# Patient Record
Sex: FEMALE | Race: BLACK OR AFRICAN AMERICAN | NOT HISPANIC OR LATINO | URBAN - METROPOLITAN AREA
[De-identification: names, ages, dates, MRNs, and addresses within clinical notes are randomized per-mention and may not be internally consistent; named-entity substitution may affect disease eponyms.]

---

## 2017-01-06 ENCOUNTER — EMERGENCY (EMERGENCY)
Facility: HOSPITAL | Age: 37
LOS: 1 days | End: 2017-01-06
Payer: OTHER MISCELLANEOUS

## 2017-01-06 PROCEDURE — 99283 EMERGENCY DEPT VISIT LOW MDM: CPT

## 2017-02-16 ENCOUNTER — OUTPATIENT (OUTPATIENT)
Dept: OUTPATIENT SERVICES | Facility: HOSPITAL | Age: 37
LOS: 1 days | End: 2017-02-16

## 2017-05-21 ENCOUNTER — EMERGENCY (EMERGENCY)
Facility: HOSPITAL | Age: 37
LOS: 1 days | End: 2017-05-21
Payer: COMMERCIAL

## 2017-05-21 PROCEDURE — 99283 EMERGENCY DEPT VISIT LOW MDM: CPT | Mod: 25

## 2017-05-21 PROCEDURE — 73610 X-RAY EXAM OF ANKLE: CPT | Mod: 26,RT

## 2017-05-21 PROCEDURE — 99053 MED SERV 10PM-8AM 24 HR FAC: CPT

## 2017-09-28 ENCOUNTER — EMERGENCY (EMERGENCY)
Facility: HOSPITAL | Age: 37
LOS: 1 days | End: 2017-09-28
Payer: COMMERCIAL

## 2017-09-28 PROCEDURE — 99284 EMERGENCY DEPT VISIT MOD MDM: CPT

## 2017-09-28 PROCEDURE — 70450 CT HEAD/BRAIN W/O DYE: CPT | Mod: 26

## 2017-09-28 PROCEDURE — 72125 CT NECK SPINE W/O DYE: CPT | Mod: 26

## 2018-12-20 ENCOUNTER — APPOINTMENT (OUTPATIENT)
Dept: OBGYN | Facility: CLINIC | Age: 38
End: 2018-12-20
Payer: COMMERCIAL

## 2018-12-20 VITALS
BODY MASS INDEX: 45.07 KG/M2 | HEIGHT: 64 IN | DIASTOLIC BLOOD PRESSURE: 74 MMHG | WEIGHT: 264 LBS | SYSTOLIC BLOOD PRESSURE: 118 MMHG

## 2018-12-20 DIAGNOSIS — N76.0 ACUTE VAGINITIS: ICD-10-CM

## 2018-12-20 PROCEDURE — 99213 OFFICE O/P EST LOW 20 MIN: CPT

## 2018-12-23 LAB
CANDIDA VAG CYTO: NOT DETECTED
G VAGINALIS+PREV SP MTYP VAG QL MICRO: DETECTED
T VAGINALIS VAG QL WET PREP: NOT DETECTED

## 2019-03-11 ENCOUNTER — OTHER (OUTPATIENT)
Age: 39
End: 2019-03-11

## 2019-03-11 ENCOUNTER — OUTPATIENT (OUTPATIENT)
Dept: OUTPATIENT SERVICES | Facility: HOSPITAL | Age: 39
LOS: 1 days | End: 2019-03-11
Payer: COMMERCIAL

## 2019-03-11 ENCOUNTER — APPOINTMENT (OUTPATIENT)
Dept: OBGYN | Facility: CLINIC | Age: 39
End: 2019-03-11
Payer: COMMERCIAL

## 2019-03-11 VITALS
HEIGHT: 64 IN | WEIGHT: 254 LBS | DIASTOLIC BLOOD PRESSURE: 80 MMHG | BODY MASS INDEX: 43.36 KG/M2 | SYSTOLIC BLOOD PRESSURE: 120 MMHG

## 2019-03-11 PROCEDURE — 73560 X-RAY EXAM OF KNEE 1 OR 2: CPT | Mod: 26,LT

## 2019-03-11 PROCEDURE — 99395 PREV VISIT EST AGE 18-39: CPT

## 2019-03-11 PROCEDURE — 71046 X-RAY EXAM CHEST 2 VIEWS: CPT | Mod: 26

## 2019-03-11 PROCEDURE — 73600 X-RAY EXAM OF ANKLE: CPT | Mod: 26,RT

## 2019-03-11 NOTE — DISCUSSION/SUMMARY
[FreeTextEntry1] : a39 years old black female came to the office for annual exam physical exam within normal limits pelvic exam within normal limits Pap smear done and the patient for mammogram as 25 minutes of

## 2019-03-11 NOTE — DISCUSSION/SUMMARY
[FreeTextEntry1] : a39 years old black female came to the office for annual exam patient denies having any problems physical on pelvic exam within normal limits within the patient for mammogram as a tightness of the

## 2019-03-13 LAB — HPV HIGH+LOW RISK DNA PNL CVX: NOT DETECTED

## 2019-03-15 LAB — CYTOLOGY CVX/VAG DOC THIN PREP: NORMAL

## 2019-03-16 ENCOUNTER — OUTPATIENT (OUTPATIENT)
Dept: OUTPATIENT SERVICES | Facility: HOSPITAL | Age: 39
LOS: 1 days | End: 2019-03-16
Payer: COMMERCIAL

## 2019-03-16 PROCEDURE — 76856 US EXAM PELVIC COMPLETE: CPT | Mod: 26

## 2019-03-16 PROCEDURE — 76700 US EXAM ABDOM COMPLETE: CPT | Mod: 26

## 2019-08-06 ENCOUNTER — EMERGENCY (EMERGENCY)
Facility: HOSPITAL | Age: 39
LOS: 1 days | End: 2019-08-06
Admitting: EMERGENCY MEDICINE
Payer: COMMERCIAL

## 2019-08-06 PROCEDURE — 99283 EMERGENCY DEPT VISIT LOW MDM: CPT

## 2020-03-12 ENCOUNTER — APPOINTMENT (OUTPATIENT)
Dept: CT IMAGING | Facility: CLINIC | Age: 40
End: 2020-03-12
Payer: COMMERCIAL

## 2020-03-12 PROCEDURE — 74176 CT ABD & PELVIS W/O CONTRAST: CPT

## 2020-04-26 ENCOUNTER — MESSAGE (OUTPATIENT)
Age: 40
End: 2020-04-26

## 2020-05-06 LAB
SARS-COV-2 IGG SERPL IA-ACNC: 0.2 RATIO
SARS-COV-2 IGG SERPL QL IA: NEGATIVE

## 2020-08-04 ENCOUNTER — OUTPATIENT (OUTPATIENT)
Dept: OUTPATIENT SERVICES | Facility: HOSPITAL | Age: 40
LOS: 1 days | End: 2020-08-04
Payer: COMMERCIAL

## 2020-08-04 PROCEDURE — 71046 X-RAY EXAM CHEST 2 VIEWS: CPT | Mod: 26

## 2020-11-25 ENCOUNTER — OUTPATIENT (OUTPATIENT)
Dept: OUTPATIENT SERVICES | Facility: HOSPITAL | Age: 40
LOS: 1 days | End: 2020-11-25

## 2020-12-16 PROBLEM — N76.0 ACUTE VAGINITIS: Status: RESOLVED | Noted: 2018-12-20 | Resolved: 2020-12-16

## 2021-08-29 ENCOUNTER — EMERGENCY (EMERGENCY)
Facility: HOSPITAL | Age: 41
LOS: 1 days | End: 2021-08-29
Admitting: EMERGENCY MEDICINE

## 2021-09-13 ENCOUNTER — APPOINTMENT (OUTPATIENT)
Dept: CT IMAGING | Facility: CLINIC | Age: 41
End: 2021-09-13
Payer: COMMERCIAL

## 2021-09-13 ENCOUNTER — APPOINTMENT (OUTPATIENT)
Dept: OBGYN | Facility: CLINIC | Age: 41
End: 2021-09-13

## 2021-09-13 PROCEDURE — 74176 CT ABD & PELVIS W/O CONTRAST: CPT

## 2021-10-07 ENCOUNTER — APPOINTMENT (OUTPATIENT)
Dept: OBGYN | Facility: CLINIC | Age: 41
End: 2021-10-07
Payer: COMMERCIAL

## 2021-10-07 VITALS
WEIGHT: 254 LBS | DIASTOLIC BLOOD PRESSURE: 80 MMHG | BODY MASS INDEX: 43.36 KG/M2 | HEIGHT: 64 IN | SYSTOLIC BLOOD PRESSURE: 114 MMHG

## 2021-10-07 DIAGNOSIS — Z72.89 OTHER PROBLEMS RELATED TO LIFESTYLE: ICD-10-CM

## 2021-10-07 DIAGNOSIS — Z13.71 ENCOUNTER FOR NONPROCREATIVE SCREENING FOR GENETIC DISEASE CARRIER STATUS: ICD-10-CM

## 2021-10-07 DIAGNOSIS — Z78.9 OTHER SPECIFIED HEALTH STATUS: ICD-10-CM

## 2021-10-07 DIAGNOSIS — Z00.00 ENCOUNTER FOR GENERAL ADULT MEDICAL EXAMINATION W/OUT ABNORMAL FINDINGS: ICD-10-CM

## 2021-10-07 DIAGNOSIS — Z83.49 FAMILY HISTORY OF OTHER ENDOCRINE, NUTRITIONAL AND METABOLIC DISEASES: ICD-10-CM

## 2021-10-07 LAB
DATE COLLECTED: NORMAL
DATE COLLECTED: NORMAL
HEMOCCULT 2: NEGATIVE
HEMOCCULT SP1 STL QL: NEGATIVE
QUALITY CONTROL: YES
QUALITY CONTROL: YES

## 2021-10-07 PROCEDURE — 82270 OCCULT BLOOD FECES: CPT

## 2021-10-07 PROCEDURE — 99396 PREV VISIT EST AGE 40-64: CPT

## 2021-10-07 RX ORDER — NYSTATIN 100000 [USP'U]/G
100000 CREAM TOPICAL TWICE DAILY
Qty: 1 | Refills: 3 | Status: DISCONTINUED | COMMUNITY
Start: 2018-12-20 | End: 2021-10-07

## 2021-10-07 RX ORDER — FLUCONAZOLE 150 MG/1
150 TABLET ORAL
Refills: 0 | Status: DISCONTINUED | COMMUNITY
End: 2021-10-07

## 2021-10-07 RX ORDER — FLUCONAZOLE 150 MG/1
150 TABLET ORAL
Qty: 3 | Refills: 3 | Status: DISCONTINUED | COMMUNITY
Start: 2018-12-20 | End: 2021-10-07

## 2021-10-07 RX ORDER — METRONIDAZOLE 500 MG/1
500 TABLET ORAL
Qty: 15 | Refills: 7 | Status: DISCONTINUED | COMMUNITY
Start: 2018-12-23 | End: 2021-10-07

## 2021-10-22 ENCOUNTER — OUTPATIENT (OUTPATIENT)
Dept: OUTPATIENT SERVICES | Facility: HOSPITAL | Age: 41
LOS: 1 days | End: 2021-10-22

## 2021-10-27 LAB
C TRACH RRNA SPEC QL NAA+PROBE: NOT DETECTED
CANDIDA VAG CYTO: NOT DETECTED
CYTOLOGY CVX/VAG DOC THIN PREP: NORMAL
G VAGINALIS+PREV SP MTYP VAG QL MICRO: NOT DETECTED
HBV SURFACE AB SER QL: REACTIVE
HBV SURFACE AG SER QL: NONREACTIVE
HCV AB SER QL: NONREACTIVE
HCV S/CO RATIO: 0.08 S/CO
HIV1+2 AB SPEC QL IA.RAPID: NONREACTIVE
HPV HIGH+LOW RISK DNA PNL CVX: NOT DETECTED
HSV1 IGM SER QL: NEGATIVE
HSV2 AB FLD-ACNC: NEGATIVE
N GONORRHOEA RRNA SPEC QL NAA+PROBE: NOT DETECTED
SOURCE TP AMPLIFICATION: NORMAL
T PALLIDUM AB SER QL IA: NEGATIVE
T VAGINALIS VAG QL WET PREP: NOT DETECTED

## 2021-11-23 ENCOUNTER — NON-APPOINTMENT (OUTPATIENT)
Age: 41
End: 2021-11-23

## 2021-11-23 LAB
HSV 1+2 IGG SER IA-IMP: NEGATIVE
HSV 1+2 IGG SER IA-IMP: POSITIVE
HSV1 IGG SER QL: 45 INDEX
HSV2 IGG SER QL: 0.55 INDEX

## 2021-11-23 NOTE — PHYSICAL EXAM
[Appropriately responsive] : appropriately responsive [Alert] : alert [No Acute Distress] : no acute distress [No Lymphadenopathy] : no lymphadenopathy [Regular Rate Rhythm] : regular rate rhythm [No Murmurs] : no murmurs [Clear to Auscultation B/L] : clear to auscultation bilaterally [Soft] : soft [Non-tender] : non-tender [Non-distended] : non-distended [No HSM] : No HSM [No Lesions] : no lesions [No Mass] : no mass [Oriented x3] : oriented x3 [Examination Of The Breasts] : a normal appearance [No Masses] : no breast masses were palpable [Labia Majora] : normal [Labia Minora] : normal [Normal] : normal [Uterine Adnexae] : normal [FreeTextEntry9] : Neg

## 2021-11-23 NOTE — HISTORY OF PRESENT ILLNESS
[Patient reported PAP Smear was normal] : Patient reported PAP Smear was normal [FreeTextEntry1] : 42yo  LMP  not on bcm here for annual. She saw her previous GYN MD and was told she had fibroids. s/p COVID vaccine (Pz).  [Mammogramdate] : Never  [BreastSonogramDate] : Never  [PapSmeardate] : 2020  [BoneDensityDate] : N/A [ColonoscopyDate] : N/A

## 2021-11-23 NOTE — DISCUSSION/SUMMARY
[FreeTextEntry1] : 40yo  LMP  doing well. \par \par RHM: \par - DVSneg x 3\par - Dentist, PCP, Derm as discussed \par - Rx given for mammo/sono\par - Offered STI screen today. Pt accepts \par - Encouraged healthy diet, exercise, weight loss\par \par Karla Auguste MD \par Obstetrician/Gynecologist\par \par

## 2022-01-25 ENCOUNTER — APPOINTMENT (OUTPATIENT)
Dept: MAMMOGRAPHY | Facility: CLINIC | Age: 42
End: 2022-01-25
Payer: COMMERCIAL

## 2022-01-25 ENCOUNTER — RESULT REVIEW (OUTPATIENT)
Age: 42
End: 2022-01-25

## 2022-01-25 PROCEDURE — 77067 SCR MAMMO BI INCL CAD: CPT

## 2022-01-25 PROCEDURE — 77063 BREAST TOMOSYNTHESIS BI: CPT

## 2022-01-27 ENCOUNTER — OUTPATIENT (OUTPATIENT)
Dept: OUTPATIENT SERVICES | Facility: HOSPITAL | Age: 42
LOS: 1 days | End: 2022-01-27

## 2022-01-27 ENCOUNTER — EMERGENCY (EMERGENCY)
Facility: HOSPITAL | Age: 42
LOS: 1 days | Discharge: HOME | End: 2022-01-27
Payer: COMMERCIAL

## 2022-01-27 DIAGNOSIS — H53.8 OTHER VISUAL DISTURBANCES: ICD-10-CM

## 2022-01-27 DIAGNOSIS — E66.01 MORBID (SEVERE) OBESITY DUE TO EXCESS CALORIES: ICD-10-CM

## 2022-01-27 DIAGNOSIS — R07.9 CHEST PAIN, UNSPECIFIED: ICD-10-CM

## 2022-01-27 PROCEDURE — 70498 CT ANGIOGRAPHY NECK: CPT | Mod: 26

## 2022-01-27 PROCEDURE — 93010 ELECTROCARDIOGRAM REPORT: CPT

## 2022-01-27 PROCEDURE — 70496 CT ANGIOGRAPHY HEAD: CPT | Mod: 26

## 2022-01-27 PROCEDURE — 0042T: CPT

## 2022-01-27 PROCEDURE — 70551 MRI BRAIN STEM W/O DYE: CPT | Mod: 26

## 2022-01-27 PROCEDURE — 99285 EMERGENCY DEPT VISIT HI MDM: CPT

## 2022-01-27 PROCEDURE — 71045 X-RAY EXAM CHEST 1 VIEW: CPT | Mod: 26

## 2022-01-28 ENCOUNTER — OUTPATIENT (OUTPATIENT)
Dept: OUTPATIENT SERVICES | Facility: HOSPITAL | Age: 42
LOS: 1 days | End: 2022-01-28

## 2022-02-08 ENCOUNTER — APPOINTMENT (OUTPATIENT)
Dept: ULTRASOUND IMAGING | Facility: CLINIC | Age: 42
End: 2022-02-08
Payer: COMMERCIAL

## 2022-02-08 ENCOUNTER — RESULT REVIEW (OUTPATIENT)
Age: 42
End: 2022-02-08

## 2022-02-08 PROCEDURE — 76641 ULTRASOUND BREAST COMPLETE: CPT | Mod: 50

## 2022-02-22 DIAGNOSIS — I67.848 OTHER CEREBROVASCULAR VASOSPASM AND VASOCONSTRICTION: ICD-10-CM

## 2022-03-02 DIAGNOSIS — I67.848 OTHER CEREBROVASCULAR VASOSPASM AND VASOCONSTRICTION: ICD-10-CM

## 2022-09-01 ENCOUNTER — EMERGENCY (EMERGENCY)
Facility: HOSPITAL | Age: 42
LOS: 1 days | Discharge: HOME | End: 2022-09-01
Admitting: EMERGENCY MEDICINE

## 2022-09-01 DIAGNOSIS — S00.03XA CONTUSION OF SCALP, INITIAL ENCOUNTER: ICD-10-CM

## 2022-09-01 DIAGNOSIS — Z04.2 ENCOUNTER FOR EXAMINATION AND OBSERVATION FOLLOWING WORK ACCIDENT: ICD-10-CM
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
## 2022-09-01 DIAGNOSIS — S09.8XXA OTHER SPECIFIED INJURIES OF HEAD, INITIAL ENCOUNTER: ICD-10-CM
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
## 2022-09-01 PROCEDURE — 99285 EMERGENCY DEPT VISIT HI MDM: CPT

## 2022-09-01 PROCEDURE — 70450 CT HEAD/BRAIN W/O DYE: CPT | Mod: 26

## 2022-09-01 PROCEDURE — 72125 CT NECK SPINE W/O DYE: CPT | Mod: 26

## 2022-11-21 ENCOUNTER — APPOINTMENT (OUTPATIENT)
Dept: OBGYN | Facility: CLINIC | Age: 42
End: 2022-11-21

## 2022-11-21 VITALS
DIASTOLIC BLOOD PRESSURE: 78 MMHG | WEIGHT: 254 LBS | HEIGHT: 69 IN | SYSTOLIC BLOOD PRESSURE: 135 MMHG | BODY MASS INDEX: 37.62 KG/M2

## 2022-11-21 DIAGNOSIS — Z82.49 FAMILY HISTORY OF ISCHEMIC HEART DISEASE AND OTHER DISEASES OF THE CIRCULATORY SYSTEM: ICD-10-CM

## 2022-11-21 DIAGNOSIS — Z80.3 FAMILY HISTORY OF MALIGNANT NEOPLASM OF BREAST: ICD-10-CM

## 2022-11-21 DIAGNOSIS — D21.9 BENIGN NEOPLASM OF CONNECTIVE AND OTHER SOFT TISSUE, UNSPECIFIED: ICD-10-CM

## 2022-11-21 DIAGNOSIS — Z87.09 PERSONAL HISTORY OF OTHER DISEASES OF THE RESPIRATORY SYSTEM: ICD-10-CM

## 2022-11-21 PROCEDURE — 99396 PREV VISIT EST AGE 40-64: CPT

## 2022-11-21 PROCEDURE — 82274 ASSAY TEST FOR BLOOD FECAL: CPT | Mod: QW

## 2022-11-21 NOTE — DISCUSSION/SUMMARY
[FreeTextEntry1] : 41yo  LMP  not on BCM.  \par \par Elevated TC score & FHx of breast ca: Pt to referred to Dr Aviles but has not gone yet \par - Explained importance of going and pt plans to go \par \par Fibroids: \par - TVUS and MD visit \par \par RHM: \par - DVS neg x 3\par - Dentist, PCP, Derm as discussed \par - Rx given for mammo/sono\par - Colonoscopy as discussed \par - Offered STI screen today. Pt declined\par - Encouraged healthy diet, exercise, weight loss\par \par Karla Auguste MD \par Obstetrician/Gynecologist\par

## 2022-11-21 NOTE — PHYSICAL EXAM
[Appropriately responsive] : appropriately responsive [Alert] : alert [No Acute Distress] : no acute distress [No Lymphadenopathy] : no lymphadenopathy [Soft] : soft [Non-tender] : non-tender [Non-distended] : non-distended [No HSM] : No HSM [No Lesions] : no lesions [No Mass] : no mass [Oriented x3] : oriented x3 [Examination Of The Breasts] : a normal appearance [No Masses] : no breast masses were palpable [Labia Majora] : normal [Labia Minora] : normal [Normal] : normal [FreeTextEntry6] : difficult to palpate 2/2 body habitus [FreeTextEntry9] : Neg

## 2022-11-21 NOTE — HISTORY OF PRESENT ILLNESS
[Patient reported PAP Smear was normal] : Patient reported PAP Smear was normal [FreeTextEntry1] : 41yo  LMP  not on BCM is here for annual. Pt is having back pain related to a fall at work.  Pt has clots with her period but otherwise no heavy bleeding.  [Mammogramdate] : 2022 [TextBox_19] : TC 37%  [BreastSonogramDate] : 2022 [PapSmeardate] : 10/2021

## 2022-11-23 LAB — HPV HIGH+LOW RISK DNA PNL CVX: NOT DETECTED

## 2022-12-02 LAB — CYTOLOGY CVX/VAG DOC THIN PREP: NORMAL

## 2023-01-03 ENCOUNTER — ASOB RESULT (OUTPATIENT)
Age: 43
End: 2023-01-03

## 2023-01-03 ENCOUNTER — APPOINTMENT (OUTPATIENT)
Dept: OBGYN | Facility: CLINIC | Age: 43
End: 2023-01-03
Payer: COMMERCIAL

## 2023-01-03 ENCOUNTER — APPOINTMENT (OUTPATIENT)
Dept: ANTEPARTUM | Facility: CLINIC | Age: 43
End: 2023-01-03
Payer: COMMERCIAL

## 2023-01-03 VITALS
SYSTOLIC BLOOD PRESSURE: 130 MMHG | WEIGHT: 271 LBS | HEIGHT: 69 IN | BODY MASS INDEX: 40.14 KG/M2 | DIASTOLIC BLOOD PRESSURE: 70 MMHG

## 2023-01-03 DIAGNOSIS — N91.1 SECONDARY AMENORRHEA: ICD-10-CM

## 2023-01-03 PROCEDURE — 76856 US EXAM PELVIC COMPLETE: CPT | Mod: 59

## 2023-01-03 PROCEDURE — 99212 OFFICE O/P EST SF 10 MIN: CPT

## 2023-01-03 PROCEDURE — 76830 TRANSVAGINAL US NON-OB: CPT

## 2023-01-07 LAB
HCG UR QL: NEGATIVE
QUALITY CONTROL: YES

## 2023-02-03 ENCOUNTER — APPOINTMENT (OUTPATIENT)
Dept: ULTRASOUND IMAGING | Facility: CLINIC | Age: 43
End: 2023-02-03
Payer: COMMERCIAL

## 2023-02-03 PROCEDURE — 93975 VASCULAR STUDY: CPT

## 2023-02-07 ENCOUNTER — NON-APPOINTMENT (OUTPATIENT)
Age: 43
End: 2023-02-07

## 2023-02-07 ENCOUNTER — APPOINTMENT (OUTPATIENT)
Dept: OBGYN | Facility: CLINIC | Age: 43
End: 2023-02-07
Payer: COMMERCIAL

## 2023-02-07 VITALS
BODY MASS INDEX: 38.95 KG/M2 | HEIGHT: 69 IN | WEIGHT: 263 LBS | DIASTOLIC BLOOD PRESSURE: 110 MMHG | SYSTOLIC BLOOD PRESSURE: 166 MMHG

## 2023-02-07 VITALS — SYSTOLIC BLOOD PRESSURE: 153 MMHG | DIASTOLIC BLOOD PRESSURE: 83 MMHG

## 2023-02-07 LAB
DATE COLLECTED: NORMAL
HEMOCCULT SP1 STL QL: NEGATIVE
QUALITY CONTROL: YES

## 2023-02-07 PROCEDURE — 99212 OFFICE O/P EST SF 10 MIN: CPT

## 2023-02-07 NOTE — HISTORY OF PRESENT ILLNESS
[FreeTextEntry1] : 44yo  LMP   is here to follow up oligomehorrhea. She had a TVUS on 1/3 which demonstrated a 6mm EMS, a 1cm simple R ov cyst; her L ov is wnl.  She has 4 fibroids ranging from 1-7cm in size. \par \par Pt's period came after she had a sono. She has not had to use the provera tabs. Her Feb period came naturally as well. \par \par She reports that her BP is elevated now as a result of meds (steriods etc.). She needed to go to the ED as her BP was  200s/100s.

## 2023-02-07 NOTE — DISCUSSION/SUMMARY
[FreeTextEntry1] : 44yo  LMP 2/  is here to follow up oligomehorrhea -- which has resolved. Now with simple R ov cyst on sono and HTN (which preeceded today's visit)\par \par HTN: BP back to reasonable level (pt says her BP was at this level in Dr Morrison's office) and she just took her meds on her way here to her visit. \par - Pt seeing Dr Morrison \par - Now on new BP meds \par \par R Ov cyst: \par - TVUS and MD visit in mid March \par - Torsion and rupture precautions reviewed \par - All questions solicited and answered \par \par Karla Mello MD \par Obstetrician/Gynecologist\par

## 2023-02-23 ENCOUNTER — RESULT REVIEW (OUTPATIENT)
Age: 43
End: 2023-02-23

## 2023-02-23 ENCOUNTER — APPOINTMENT (OUTPATIENT)
Dept: MAMMOGRAPHY | Facility: CLINIC | Age: 43
End: 2023-02-23
Payer: COMMERCIAL

## 2023-02-23 PROCEDURE — 77067 SCR MAMMO BI INCL CAD: CPT

## 2023-02-23 PROCEDURE — 77063 BREAST TOMOSYNTHESIS BI: CPT

## 2023-02-28 ENCOUNTER — APPOINTMENT (OUTPATIENT)
Dept: ULTRASOUND IMAGING | Facility: CLINIC | Age: 43
End: 2023-02-28

## 2023-02-28 ENCOUNTER — APPOINTMENT (OUTPATIENT)
Dept: CARDIOLOGY | Facility: CLINIC | Age: 43
End: 2023-02-28
Payer: COMMERCIAL

## 2023-02-28 ENCOUNTER — NON-APPOINTMENT (OUTPATIENT)
Age: 43
End: 2023-02-28

## 2023-02-28 VITALS — SYSTOLIC BLOOD PRESSURE: 136 MMHG | DIASTOLIC BLOOD PRESSURE: 90 MMHG

## 2023-02-28 VITALS
OXYGEN SATURATION: 98 % | SYSTOLIC BLOOD PRESSURE: 142 MMHG | HEART RATE: 77 BPM | WEIGHT: 264 LBS | BODY MASS INDEX: 45.07 KG/M2 | HEIGHT: 64 IN | DIASTOLIC BLOOD PRESSURE: 90 MMHG

## 2023-02-28 DIAGNOSIS — Z82.3 FAMILY HISTORY OF STROKE: ICD-10-CM

## 2023-02-28 DIAGNOSIS — K21.9 GASTRO-ESOPHAGEAL REFLUX DISEASE W/OUT ESOPHAGITIS: ICD-10-CM

## 2023-02-28 DIAGNOSIS — Z87.39 PERSONAL HISTORY OF OTHER DISEASES OF THE MUSCULOSKELETAL SYSTEM AND CONNECTIVE TISSUE: ICD-10-CM

## 2023-02-28 DIAGNOSIS — Z82.49 FAMILY HISTORY OF ISCHEMIC HEART DISEASE AND OTHER DISEASES OF THE CIRCULATORY SYSTEM: ICD-10-CM

## 2023-02-28 PROCEDURE — 99204 OFFICE O/P NEW MOD 45 MIN: CPT | Mod: 25

## 2023-02-28 PROCEDURE — 93000 ELECTROCARDIOGRAM COMPLETE: CPT

## 2023-02-28 RX ORDER — MEDROXYPROGESTERONE ACETATE 10 MG/1
10 TABLET ORAL DAILY
Qty: 14 | Refills: 1 | Status: DISCONTINUED | COMMUNITY
Start: 2023-01-03 | End: 2023-02-28

## 2023-02-28 RX ORDER — MULTIVIT-MIN/FOLIC/VIT K/LYCOP 400-300MCG
50 MCG TABLET ORAL
Refills: 0 | Status: DISCONTINUED | COMMUNITY
End: 2023-02-28

## 2023-03-01 NOTE — ASSESSMENT
[FreeTextEntry1] : Discussed importance of long-term blood pressure control to reduce cardiovascular risk.\par Echocardiogram to evaluate for hypertensive heart disease.  \par Start losartan hydrochlorothiazide.  Follow-up blood work.  Obtain recent blood work. \par Weight loss for long-term cardiovascular health.  Consider evaluation for sleep apnea if refractory hypertension.  \par Increase aerobic exercise as tolerated.  Low-sodium diet.  Limit nonsteroidal anti-inflammatories.  \par \par \par

## 2023-03-01 NOTE — HISTORY OF PRESENT ILLNESS
[FreeTextEntry1] : JAZMYNE KAMARA  is a 43 year old  F\par \par \par Referred by primary physician.  \par Follow-up after recent ER visit.  \par Obese -[employer] employee.  \par Difficulty with chronic lower back pain.  \par Significant family history of hypertension and vascular disease.  \par Initially recognized to have elevated blood pressure at routine evaluation.  \par At that time she was on prednisone. \par However her blood pressure has been persistently elevated.  She now takes metoprolol.  \par She is unaware of her elevated blood pressure.  \par \par There is no prior history of a clinical myocardial infarction, coronary revascularization. \par There is no history of symptomatic congestive heart failure rheumatic heart disease or valvular disease.\par There is no history of symptomatic arrhythmias including atrial fibrillation.\par \par There is no exertional chest pain, pressure or discomfort. \par There is no significant dyspnea on exertion or orthopnea. \par There are no symptomatic palpitations, lightheadedness, dizziness or syncope.\par \par Outside EKG demonstrates sinus arrhythmia \par blood work January 2023 hemoglobin 11.5 creatinine 0.8 \par outside chest x-ray unremarkable \par outside discharge summary presented with visual loss with lightheadedness transient normal MRI no events on telemetry evaluated by neurology discharged on aspirin follows with Dr. Morrison\par more recently she was seen in the emergency room for elevated blood pressure initially noted at evaluation for back pain started on beta-blocker
The patient is a 89y Female complaining of fall.

## 2023-03-10 ENCOUNTER — APPOINTMENT (OUTPATIENT)
Dept: CARDIOLOGY | Facility: CLINIC | Age: 43
End: 2023-03-10
Payer: COMMERCIAL

## 2023-03-10 VITALS
BODY MASS INDEX: 45.07 KG/M2 | WEIGHT: 264 LBS | SYSTOLIC BLOOD PRESSURE: 134 MMHG | HEIGHT: 64 IN | DIASTOLIC BLOOD PRESSURE: 78 MMHG | OXYGEN SATURATION: 99 % | TEMPERATURE: 97.1 F | HEART RATE: 105 BPM

## 2023-03-10 PROCEDURE — 99214 OFFICE O/P EST MOD 30 MIN: CPT

## 2023-03-10 RX ORDER — ALBUTEROL SULFATE 90 UG/1
108 (90 BASE) INHALANT RESPIRATORY (INHALATION)
Qty: 6 | Refills: 0 | Status: DISCONTINUED | COMMUNITY
Start: 2022-10-25 | End: 2023-03-10

## 2023-03-10 NOTE — ADDENDUM
[FreeTextEntry1] : Please note the patient was reviewed with NP Myla Trivedi.\par I was physically present during the service of the patient.\par I was directly involved in the management plan and recommendations of the care provided to the patient. \par I personally reviewed the history and physical examination as documented by the NP above.\par \par

## 2023-03-10 NOTE — ASSESSMENT
[FreeTextEntry1] : JAZMYNE KAMARA is a 43 year old F who presents today Mar 10, 2023 with the above history and the following active issues: \par \par HTN\par Tachy\par Note ER visit\par Discussed importance of long-term blood pressure control to reduce cardiovascular risk.\par Echocardiogram to evaluate for hypertensive heart disease.  \par Cont losartan hydrochlorothiazide in AM. Cont toprol in PM. \par Home BPs much improved but HR remains elevated. 3 day monitor placed to eval resting HR. \par normetanephrine elevated on bw from PCP \par \par CP\par in the setting of missing toprol dosing and HTN\par ruled out for ACS\par no recurrence\par ETT to r/o ischemia \par \par Weight loss for long-term cardiovascular health.  Consider evaluation for sleep apnea if refractory hypertension.  \par Increase aerobic exercise as tolerated.  Low-sodium diet.  Limit nonsteroidal anti-inflammatories. \par F/U planned after above testing  \par \par Sincerely,\par \par MIKHAIL Smith\par Patients history, testing, and plan reviewed with supervising MD: Dr. Dann Valentino \par \par \par

## 2023-03-10 NOTE — HISTORY OF PRESENT ILLNESS
[FreeTextEntry1] : JAZMYNE KAMARA  is a 43 year old  F\par \par Referred by primary physician.  \par Follow-up after recent ER visit.  \par Obese -American [employer] employee.  \par Difficulty with chronic lower back pain.  \par Significant family history of hypertension and vascular disease.  \par Initially recognized to have elevated blood pressure at routine evaluation.  \par At that time she was on prednisone. \par However her blood pressure has been persistently elevated.  She now takes metoprolol.  \par She is unaware of her elevated blood pressure.  \par \par There is no prior history of a clinical myocardial infarction, coronary revascularization. \par There is no history of symptomatic congestive heart failure rheumatic heart disease or valvular disease.\par There is no history of symptomatic arrhythmias including atrial fibrillation.\par \par There is no exertional chest pain, pressure or discomfort. \par There is no significant dyspnea on exertion or orthopnea. \par There are no symptomatic palpitations, lightheadedness, dizziness or syncope.\par \par Last visit HTN started on losartan hydrochlorothiazide. She feels tired after taking med in AM. \par She continues to take toprol 50mg in PM. \par Her home BPs are very good 110s/70s but HR 90-100s. \par \par Since last seen was in ER for CP. \par 3/5/23 was driving home from city late at night felt the "worst pain ever" left side of her chest radiating to neck and left shoulder. States it was like a cramp that she couldn't work out. She realized she forgot PM toprol dose so went home took it and went to ER. On arrival /93. \par Trop 12, hg 11.8, k 4.2, cr 0.9\par EKG normal sinus rhythm no STEMI\par sx resolved on their own and d/c for outpatient followup \par \par outside chest x-ray unremarkable \par outside discharge summary presented with visual loss with lightheadedness transient normal MRI no events on telemetry evaluated by neurology discharged on aspirin follows with Dr. Morrison\par there was then a separate ER visit for HTN \par labs from PCP 24h urine metanephrines WNL, renin/glenys/metanephrine WNL but normetanephrines 185 (upper limits 148) \par

## 2023-03-14 ENCOUNTER — APPOINTMENT (OUTPATIENT)
Dept: ULTRASOUND IMAGING | Facility: CLINIC | Age: 43
End: 2023-03-14

## 2023-03-14 ENCOUNTER — RESULT REVIEW (OUTPATIENT)
Age: 43
End: 2023-03-14

## 2023-03-14 ENCOUNTER — APPOINTMENT (OUTPATIENT)
Dept: OBGYN | Facility: CLINIC | Age: 43
End: 2023-03-14
Payer: COMMERCIAL

## 2023-03-14 ENCOUNTER — APPOINTMENT (OUTPATIENT)
Dept: ANTEPARTUM | Facility: CLINIC | Age: 43
End: 2023-03-14
Payer: COMMERCIAL

## 2023-03-14 ENCOUNTER — ASOB RESULT (OUTPATIENT)
Age: 43
End: 2023-03-14

## 2023-03-14 ENCOUNTER — APPOINTMENT (OUTPATIENT)
Dept: MAMMOGRAPHY | Facility: CLINIC | Age: 43
End: 2023-03-14
Payer: COMMERCIAL

## 2023-03-14 VITALS
WEIGHT: 261 LBS | SYSTOLIC BLOOD PRESSURE: 118 MMHG | HEIGHT: 64 IN | DIASTOLIC BLOOD PRESSURE: 79 MMHG | BODY MASS INDEX: 44.56 KG/M2

## 2023-03-14 PROCEDURE — 77061 BREAST TOMOSYNTHESIS UNI: CPT

## 2023-03-14 PROCEDURE — 76641 ULTRASOUND BREAST COMPLETE: CPT | Mod: 50

## 2023-03-14 PROCEDURE — 77065 DX MAMMO INCL CAD UNI: CPT | Mod: RT

## 2023-03-14 PROCEDURE — 76856 US EXAM PELVIC COMPLETE: CPT | Mod: 59

## 2023-03-14 PROCEDURE — 76830 TRANSVAGINAL US NON-OB: CPT

## 2023-03-14 PROCEDURE — 99212 OFFICE O/P EST SF 10 MIN: CPT

## 2023-03-14 NOTE — HISTORY OF PRESENT ILLNESS
[FreeTextEntry1] : 44yo  LMP 3/ which was wnl is here for results review. She had a TVUS which revealed resolved R ov cyst and stable fibroids. She was started on a new BP med which is regulating her BP well. \par \par She reports hot flushes and night sweats.

## 2023-03-14 NOTE — DISCUSSION/SUMMARY
[FreeTextEntry1] : 42yo  LMP 3/4 with improved BP, resolved R ov cyst and resumed menses. Doing well despite hot flushes and night sweats. \par \par - Recommended Reslizen x 3 months. RTPO in 3 months for evaluation. \par - No E2 given HTN \par \par Karla Mello MD \par Obstetrician/Gynecologist\par \par \par

## 2023-03-15 ENCOUNTER — APPOINTMENT (OUTPATIENT)
Dept: CARDIOLOGY | Facility: CLINIC | Age: 43
End: 2023-03-15
Payer: COMMERCIAL

## 2023-03-15 PROCEDURE — 93306 TTE W/DOPPLER COMPLETE: CPT

## 2023-03-20 ENCOUNTER — OUTPATIENT (OUTPATIENT)
Dept: OUTPATIENT SERVICES | Facility: HOSPITAL | Age: 43
LOS: 1 days | End: 2023-03-20
Payer: COMMERCIAL

## 2023-03-20 VITALS
SYSTOLIC BLOOD PRESSURE: 107 MMHG | OXYGEN SATURATION: 100 % | RESPIRATION RATE: 18 BRPM | HEIGHT: 64 IN | DIASTOLIC BLOOD PRESSURE: 77 MMHG | TEMPERATURE: 98 F | HEART RATE: 92 BPM | WEIGHT: 259.93 LBS

## 2023-03-20 DIAGNOSIS — M51.26 OTHER INTERVERTEBRAL DISC DISPLACEMENT, LUMBAR REGION: ICD-10-CM

## 2023-03-20 DIAGNOSIS — Z98.890 OTHER SPECIFIED POSTPROCEDURAL STATES: Chronic | ICD-10-CM

## 2023-03-20 DIAGNOSIS — M51.27 OTHER INTERVERTEBRAL DISC DISPLACEMENT, LUMBOSACRAL REGION: ICD-10-CM

## 2023-03-20 DIAGNOSIS — Z98.891 HISTORY OF UTERINE SCAR FROM PREVIOUS SURGERY: Chronic | ICD-10-CM

## 2023-03-20 DIAGNOSIS — Z90.49 ACQUIRED ABSENCE OF OTHER SPECIFIED PARTS OF DIGESTIVE TRACT: Chronic | ICD-10-CM

## 2023-03-20 LAB
A1C WITH ESTIMATED AVERAGE GLUCOSE RESULT: 6.1 % — HIGH (ref 4–5.6)
ABO RH CONFIRMATION: SIGNIFICANT CHANGE UP
ALBUMIN SERPL ELPH-MCNC: 3.8 G/DL — SIGNIFICANT CHANGE UP (ref 3.3–5)
ALP SERPL-CCNC: 58 U/L — SIGNIFICANT CHANGE UP (ref 40–120)
ALT FLD-CCNC: 18 U/L — SIGNIFICANT CHANGE UP (ref 12–78)
ANION GAP SERPL CALC-SCNC: 3 MMOL/L — LOW (ref 5–17)
APPEARANCE UR: CLEAR — SIGNIFICANT CHANGE UP
APTT BLD: 27.5 SEC — SIGNIFICANT CHANGE UP (ref 27.5–35.5)
AST SERPL-CCNC: 14 U/L — LOW (ref 15–37)
BASOPHILS # BLD AUTO: 0.08 K/UL — SIGNIFICANT CHANGE UP (ref 0–0.2)
BASOPHILS NFR BLD AUTO: 1 % — SIGNIFICANT CHANGE UP (ref 0–2)
BILIRUB DIRECT SERPL-MCNC: <0.1 MG/DL — SIGNIFICANT CHANGE UP (ref 0–0.3)
BILIRUB SERPL-MCNC: 0.2 MG/DL — SIGNIFICANT CHANGE UP (ref 0.2–1.2)
BILIRUB UR-MCNC: NEGATIVE — SIGNIFICANT CHANGE UP
BLD GP AB SCN SERPL QL: SIGNIFICANT CHANGE UP
BUN SERPL-MCNC: 7 MG/DL — SIGNIFICANT CHANGE UP (ref 7–23)
CALCIUM SERPL-MCNC: 9.9 MG/DL — SIGNIFICANT CHANGE UP (ref 8.5–10.1)
CHLORIDE SERPL-SCNC: 106 MMOL/L — SIGNIFICANT CHANGE UP (ref 96–108)
CO2 SERPL-SCNC: 28 MMOL/L — SIGNIFICANT CHANGE UP (ref 22–31)
COLOR SPEC: YELLOW — SIGNIFICANT CHANGE UP
CREAT SERPL-MCNC: 0.89 MG/DL — SIGNIFICANT CHANGE UP (ref 0.5–1.3)
DIFF PNL FLD: NEGATIVE — SIGNIFICANT CHANGE UP
EGFR: 82 ML/MIN/1.73M2 — SIGNIFICANT CHANGE UP
EOSINOPHIL # BLD AUTO: 0.15 K/UL — SIGNIFICANT CHANGE UP (ref 0–0.5)
EOSINOPHIL NFR BLD AUTO: 1.8 % — SIGNIFICANT CHANGE UP (ref 0–6)
ESTIMATED AVERAGE GLUCOSE: 128 MG/DL — HIGH (ref 68–114)
GLUCOSE SERPL-MCNC: 97 MG/DL — SIGNIFICANT CHANGE UP (ref 70–99)
GLUCOSE UR QL: NEGATIVE — SIGNIFICANT CHANGE UP
HCT VFR BLD CALC: 37.7 % — SIGNIFICANT CHANGE UP (ref 34.5–45)
HGB BLD-MCNC: 11.6 G/DL — SIGNIFICANT CHANGE UP (ref 11.5–15.5)
IMM GRANULOCYTES NFR BLD AUTO: 0.2 % — SIGNIFICANT CHANGE UP (ref 0–0.9)
INR BLD: 1 RATIO — SIGNIFICANT CHANGE UP (ref 0.88–1.16)
KETONES UR-MCNC: NEGATIVE — SIGNIFICANT CHANGE UP
LEUKOCYTE ESTERASE UR-ACNC: NEGATIVE — SIGNIFICANT CHANGE UP
LYMPHOCYTES # BLD AUTO: 2.96 K/UL — SIGNIFICANT CHANGE UP (ref 1–3.3)
LYMPHOCYTES # BLD AUTO: 36.3 % — SIGNIFICANT CHANGE UP (ref 13–44)
MCHC RBC-ENTMCNC: 26 PG — LOW (ref 27–34)
MCHC RBC-ENTMCNC: 30.8 GM/DL — LOW (ref 32–36)
MCV RBC AUTO: 84.5 FL — SIGNIFICANT CHANGE UP (ref 80–100)
MONOCYTES # BLD AUTO: 0.94 K/UL — HIGH (ref 0–0.9)
MONOCYTES NFR BLD AUTO: 11.5 % — SIGNIFICANT CHANGE UP (ref 2–14)
NEUTROPHILS # BLD AUTO: 4 K/UL — SIGNIFICANT CHANGE UP (ref 1.8–7.4)
NEUTROPHILS NFR BLD AUTO: 49.2 % — SIGNIFICANT CHANGE UP (ref 43–77)
NITRITE UR-MCNC: NEGATIVE — SIGNIFICANT CHANGE UP
PH UR: 5 — SIGNIFICANT CHANGE UP (ref 5–8)
PLATELET # BLD AUTO: 346 K/UL — SIGNIFICANT CHANGE UP (ref 150–400)
POTASSIUM SERPL-MCNC: 3.5 MMOL/L — SIGNIFICANT CHANGE UP (ref 3.5–5.3)
POTASSIUM SERPL-SCNC: 3.5 MMOL/L — SIGNIFICANT CHANGE UP (ref 3.5–5.3)
PROT SERPL-MCNC: 8 GM/DL — SIGNIFICANT CHANGE UP (ref 6–8.3)
PROT UR-MCNC: NEGATIVE — SIGNIFICANT CHANGE UP
PROTHROM AB SERPL-ACNC: 11.6 SEC — SIGNIFICANT CHANGE UP (ref 10.5–13.4)
RBC # BLD: 4.46 M/UL — SIGNIFICANT CHANGE UP (ref 3.8–5.2)
RBC # FLD: 14.2 % — SIGNIFICANT CHANGE UP (ref 10.3–14.5)
SODIUM SERPL-SCNC: 137 MMOL/L — SIGNIFICANT CHANGE UP (ref 135–145)
SP GR SPEC: 1.01 — SIGNIFICANT CHANGE UP (ref 1.01–1.02)
UROBILINOGEN FLD QL: NEGATIVE — SIGNIFICANT CHANGE UP
WBC # BLD: 8.15 K/UL — SIGNIFICANT CHANGE UP (ref 3.8–10.5)
WBC # FLD AUTO: 8.15 K/UL — SIGNIFICANT CHANGE UP (ref 3.8–10.5)

## 2023-03-20 PROCEDURE — 86901 BLOOD TYPING SEROLOGIC RH(D): CPT

## 2023-03-20 PROCEDURE — 87641 MR-STAPH DNA AMP PROBE: CPT

## 2023-03-20 PROCEDURE — 85025 COMPLETE CBC W/AUTO DIFF WBC: CPT

## 2023-03-20 PROCEDURE — 93005 ELECTROCARDIOGRAM TRACING: CPT

## 2023-03-20 PROCEDURE — 87086 URINE CULTURE/COLONY COUNT: CPT

## 2023-03-20 PROCEDURE — 85610 PROTHROMBIN TIME: CPT

## 2023-03-20 PROCEDURE — 99214 OFFICE O/P EST MOD 30 MIN: CPT | Mod: 25

## 2023-03-20 PROCEDURE — 85730 THROMBOPLASTIN TIME PARTIAL: CPT

## 2023-03-20 PROCEDURE — 81003 URINALYSIS AUTO W/O SCOPE: CPT

## 2023-03-20 PROCEDURE — 82248 BILIRUBIN DIRECT: CPT

## 2023-03-20 PROCEDURE — 71046 X-RAY EXAM CHEST 2 VIEWS: CPT | Mod: 26

## 2023-03-20 PROCEDURE — 36415 COLL VENOUS BLD VENIPUNCTURE: CPT

## 2023-03-20 PROCEDURE — 71046 X-RAY EXAM CHEST 2 VIEWS: CPT

## 2023-03-20 PROCEDURE — 86900 BLOOD TYPING SEROLOGIC ABO: CPT

## 2023-03-20 PROCEDURE — 87640 STAPH A DNA AMP PROBE: CPT

## 2023-03-20 PROCEDURE — 83036 HEMOGLOBIN GLYCOSYLATED A1C: CPT

## 2023-03-20 PROCEDURE — 93010 ELECTROCARDIOGRAM REPORT: CPT

## 2023-03-20 PROCEDURE — 80053 COMPREHEN METABOLIC PANEL: CPT

## 2023-03-20 PROCEDURE — 86850 RBC ANTIBODY SCREEN: CPT

## 2023-03-20 NOTE — H&P PST ADULT - NSICDXPASTMEDICALHX_GEN_ALL_CORE_FT
PAST MEDICAL HISTORY:  GERD (gastroesophageal reflux disease)     Herniated nucleus pulposus, L5-S1     HTN (hypertension)     Left sided sciatica     Lumbar radiculitis     Lumbar stenosis     Obesity     Vitamin D deficiency

## 2023-03-20 NOTE — H&P PST ADULT - ASSESSMENT
44 y/o female with herniated disc L5-S1, left sided sciatica, radiculitis, PMH of obesity, GERD, HTN. Pt reports she fell backwards at work on 09/01/2023, pt reports 10/10 lower back pain that radiates to her left leg with numbness. She is scheduled for Left L5-S1 laminectomy, excision of herniated disc.  Plan  1. Stop all NSAIDS, herbal supplements and vitamins for 7 days.  2. NPO as per ASU instructions  3. Take the following medications ( losartan, if available without diuretic ) with small sips of water on the morning of your procedure/surgery.  4. Use EZ sponges as directed  5. Use mupirocin as directed  6. Labs, EKG, CXR as per surgeon.   7. PMD visit for optimization prior to surgery as per surgeon.         44 y/o female with herniated disc L5-S1, left sided sciatica, radiculitis, PMH of obesity, GERD, HTN. Pt reports she fell backwards at work on 09/01/2023, pt reports 10/10 lower back pain that radiates to her left leg with numbness. She is scheduled for Left L5-S1 laminectomy, excision of herniated disc.  Plan  1. Stop all NSAIDS, herbal supplements and vitamins for 7 days.  2. NPO as per ASU instructions  3. Take the following medications ( losartan, if available without diuretic ) with small sips of water on the morning of your procedure/surgery.  4. Use EZ sponges as directed  5. Use mupirocin as directed  6. Labs, EKG, CXR as per surgeon.   7. PMD visit for optimization prior to surgery as per surgeon.  8. STAT urine pregnancy test on admission

## 2023-03-20 NOTE — H&P PST ADULT - NSICDXFAMILYHX_GEN_ALL_CORE_FT
FAMILY HISTORY:  Family history of breast cancer    Father  Still living? Unknown  FH: HTN (hypertension), Age at diagnosis: Age Unknown    Sibling  Still living? Unknown  Family history of cerebrovascular accident (CVA), Age at diagnosis: Age Unknown  FH: HTN (hypertension), Age at diagnosis: Age Unknown    Aunt  Still living? Unknown  Family history of cerebrovascular accident (CVA), Age at diagnosis: Age Unknown  FH: HTN (hypertension), Age at diagnosis: Age Unknown    Uncle  Still living? Unknown  FH: MI (myocardial infarction), Age at diagnosis: Age Unknown

## 2023-03-20 NOTE — H&P PST ADULT - HISTORY OF PRESENT ILLNESS
44 y/o female with herniated disc L5-S1, left sided sciatica, radiculitis, PMH of obesity, GERD, HTN. Pt reports she fell backwards at work on 09/01/2023, pt reports 10/10 lower back pain that radiates to her left leg with numbness. Pt had Physical therapy and steroid injections without pain relief, pt has difficulty getting on bed, getting out of bed, sitting for long periods. She is here for PST for scheduled Left L5-S1 laminectomy, excision of herniated disc.

## 2023-03-20 NOTE — H&P PST ADULT - NSANTHOSAYNRD_GEN_A_CORE
No. CLEMENCIA screening performed.  STOP BANG Legend: 0-2 = LOW Risk; 3-4 = INTERMEDIATE Risk; 5-8 = HIGH Risk

## 2023-03-21 ENCOUNTER — APPOINTMENT (OUTPATIENT)
Dept: CARDIOLOGY | Facility: CLINIC | Age: 43
End: 2023-03-21
Payer: COMMERCIAL

## 2023-03-21 DIAGNOSIS — M51.26 OTHER INTERVERTEBRAL DISC DISPLACEMENT, LUMBAR REGION: ICD-10-CM

## 2023-03-21 DIAGNOSIS — M51.27 OTHER INTERVERTEBRAL DISC DISPLACEMENT, LUMBOSACRAL REGION: ICD-10-CM

## 2023-03-21 PROBLEM — M54.16 RADICULOPATHY, LUMBAR REGION: Chronic | Status: ACTIVE | Noted: 2023-03-20

## 2023-03-21 PROBLEM — I10 ESSENTIAL (PRIMARY) HYPERTENSION: Chronic | Status: ACTIVE | Noted: 2023-03-20

## 2023-03-21 PROBLEM — E55.9 VITAMIN D DEFICIENCY, UNSPECIFIED: Chronic | Status: ACTIVE | Noted: 2023-03-20

## 2023-03-21 PROBLEM — E66.9 OBESITY, UNSPECIFIED: Chronic | Status: ACTIVE | Noted: 2023-03-20

## 2023-03-21 PROBLEM — M48.061 SPINAL STENOSIS, LUMBAR REGION WITHOUT NEUROGENIC CLAUDICATION: Chronic | Status: ACTIVE | Noted: 2023-03-20

## 2023-03-21 PROBLEM — K21.9 GASTRO-ESOPHAGEAL REFLUX DISEASE WITHOUT ESOPHAGITIS: Chronic | Status: ACTIVE | Noted: 2023-03-20

## 2023-03-21 PROBLEM — M54.32 SCIATICA, LEFT SIDE: Chronic | Status: ACTIVE | Noted: 2023-03-20

## 2023-03-21 LAB
CULTURE RESULTS: SIGNIFICANT CHANGE UP
MRSA PCR RESULT.: SIGNIFICANT CHANGE UP
S AUREUS DNA NOSE QL NAA+PROBE: SIGNIFICANT CHANGE UP
SPECIMEN SOURCE: SIGNIFICANT CHANGE UP

## 2023-03-21 PROCEDURE — 93015 CV STRESS TEST SUPVJ I&R: CPT

## 2023-03-24 ENCOUNTER — APPOINTMENT (OUTPATIENT)
Dept: CARDIOLOGY | Facility: CLINIC | Age: 43
End: 2023-03-24
Payer: COMMERCIAL

## 2023-03-24 VITALS
WEIGHT: 261 LBS | DIASTOLIC BLOOD PRESSURE: 78 MMHG | SYSTOLIC BLOOD PRESSURE: 140 MMHG | HEIGHT: 64 IN | OXYGEN SATURATION: 100 % | HEART RATE: 115 BPM | BODY MASS INDEX: 44.56 KG/M2

## 2023-03-24 DIAGNOSIS — R07.89 OTHER CHEST PAIN: ICD-10-CM

## 2023-03-24 PROCEDURE — 99214 OFFICE O/P EST MOD 30 MIN: CPT

## 2023-03-24 NOTE — ASSESSMENT
[FreeTextEntry1] : JAZMYNE KAMARA is a 43 year old F who presents today Mar 24, 2023 with the above history and the following active issues: \par \par Preoperative cardiovascular examination, laminectomy. \par At present, there are no active cardiac conditions. \par No recent unstable coronary syndromes, decompensated heart failure, severe valvular heart disease or significant dysrhythmias.  \par Baseline functional status is acceptable.    \par The clinical benefit of the proposed procedure outweighs the associated cardiovascular risk.  \par Risk not attenuated with further CV testing.  \par Prior testing as outlined above.\par Optimized from a cardiovascular perspective.\par Continue beta blocker perioperatively \par DVT ppx\par \par HTN\par Tachy\par Note ER visit\par Discussed importance of long-term blood pressure control to reduce cardiovascular risk.\par Echocardiogram  normal EF, mild septal hypertrophy \par BP much improved. \par Avg rate 95 bpm on monitor. \par Pt concerned about resting tachycardia - I think back pain is contributing and we should reassess postop. \par Will not uptitrate toprol given fatigue \par Cont losartan hydrochlorothiazide in AM. Cont toprol in PM. \par Surveillance monitoring of HHD \par \par CP\par in the setting of missing toprol dosing and HTN\par ruled out for ACS\par no recurrence\par ETT no ischemia \par \par Weight loss for long-term cardiovascular health.  Consider evaluation for sleep apnea if refractory hypertension.  \par Increase aerobic exercise as tolerated.  Low-sodium diet.  Limit nonsteroidal anti-inflammatories. \par \par F/U 4- 6 weeks to reassess HR/BP control \par \par Sincerely,\par \par MIKHAIL Smith\par Patients history, testing, and plan reviewed with supervising MD: Dr. Dann Valentino \par

## 2023-03-24 NOTE — HISTORY OF PRESENT ILLNESS
[FreeTextEntry1] : JAZMYNE KAMARA  is a 43 year old  F\par \par Referred by primary physician.  \par Follow-up after recent ER visit.  \par Obese -[employer] employee.  \par Difficulty with chronic lower back pain.  \par Significant family history of hypertension and vascular disease.  \par Initially recognized to have elevated blood pressure at routine evaluation.  \par At that time she was on prednisone. \par However her blood pressure has been persistently elevated.  She now takes metoprolol.  \par She is unaware of her elevated blood pressure.  \par \par There is no prior history of a clinical myocardial infarction, coronary revascularization. \par There is no history of symptomatic congestive heart failure rheumatic heart disease or valvular disease.\par There is no history of symptomatic arrhythmias including atrial fibrillation.\par \par There is no exertional chest pain, pressure or discomfort. \par There is no significant dyspnea on exertion or orthopnea. \par There are no symptomatic palpitations, lightheadedness, dizziness or syncope.\par \par Last visit HTN started on losartan hydrochlorothiazide. She feels tired after taking med in AM. \par She continues to take toprol 50mg in PM. \par Her home BPs are very good 110s/70s but HR 90-100s. \par \par Since last seen was in ER for CP. \par 3/5/23 was driving home from city late at night felt the "worst pain ever" left side of her chest radiating to neck and left shoulder. States it was like a cramp that she couldn't work out. She realized she forgot PM toprol dose so went home took it and went to ER. On arrival /93. \par Trop 12, hg 11.8, k 4.2, cr 0.9\par EKG normal sinus rhythm no STEMI\par sx resolved on their own \par \par There has been no further CP. BP very well controlled. \par Requires laminectomy next week in [place]. Sees [facility]. \par \par Lab 3/16/23 k 3.9, cr 0.85 \par ETT 3/22/23 6 min 25 sec no ischemia\par resting /68 peak 180/72 \par resting  peak 186, 105% mphr \par \par TTE 3/15/23 EF 65-70%, mild septal hypertrophy\par Monitor normal sinus rhythm avg 95 bpm, rare ectopy\par \par outside chest x-ray unremarkable \par outside discharge summary presented with visual loss with lightheadedness transient normal MRI no events on telemetry evaluated by neurology discharged on aspirin follows with Dr. Morrison\par there was then a separate ER visit for HTN \par labs from PCP 24h urine metanephrines WNL, renin/glenys/metanephrine WNL but normetanephrines 185 (upper limits 148) \par

## 2023-03-28 ENCOUNTER — TRANSCRIPTION ENCOUNTER (OUTPATIENT)
Age: 43
End: 2023-03-28

## 2023-03-28 ENCOUNTER — OUTPATIENT (OUTPATIENT)
Dept: INPATIENT UNIT | Facility: HOSPITAL | Age: 43
LOS: 1 days | Discharge: HOME | End: 2023-03-28
Payer: COMMERCIAL

## 2023-03-28 ENCOUNTER — APPOINTMENT (OUTPATIENT)
Dept: CARDIOLOGY | Facility: CLINIC | Age: 43
End: 2023-03-28

## 2023-03-28 VITALS
OXYGEN SATURATION: 98 % | DIASTOLIC BLOOD PRESSURE: 85 MMHG | RESPIRATION RATE: 21 BRPM | TEMPERATURE: 98 F | SYSTOLIC BLOOD PRESSURE: 136 MMHG | HEART RATE: 102 BPM

## 2023-03-28 VITALS
TEMPERATURE: 98 F | DIASTOLIC BLOOD PRESSURE: 73 MMHG | HEART RATE: 102 BPM | SYSTOLIC BLOOD PRESSURE: 119 MMHG | RESPIRATION RATE: 18 BRPM | OXYGEN SATURATION: 100 %

## 2023-03-28 DIAGNOSIS — M51.26 OTHER INTERVERTEBRAL DISC DISPLACEMENT, LUMBAR REGION: ICD-10-CM

## 2023-03-28 DIAGNOSIS — Z90.49 ACQUIRED ABSENCE OF OTHER SPECIFIED PARTS OF DIGESTIVE TRACT: Chronic | ICD-10-CM

## 2023-03-28 DIAGNOSIS — M51.27 OTHER INTERVERTEBRAL DISC DISPLACEMENT, LUMBOSACRAL REGION: ICD-10-CM

## 2023-03-28 DIAGNOSIS — Z98.891 HISTORY OF UTERINE SCAR FROM PREVIOUS SURGERY: Chronic | ICD-10-CM

## 2023-03-28 DIAGNOSIS — Z98.890 OTHER SPECIFIED POSTPROCEDURAL STATES: Chronic | ICD-10-CM

## 2023-03-28 LAB
GLUCOSE BLDC GLUCOMTR-MCNC: 102 MG/DL — HIGH (ref 70–99)
GLUCOSE BLDC GLUCOMTR-MCNC: 81 MG/DL — SIGNIFICANT CHANGE UP (ref 70–99)
HCG UR QL: NEGATIVE — SIGNIFICANT CHANGE UP

## 2023-03-28 PROCEDURE — 88304 TISSUE EXAM BY PATHOLOGIST: CPT

## 2023-03-28 PROCEDURE — 76000 FLUOROSCOPY <1 HR PHYS/QHP: CPT

## 2023-03-28 PROCEDURE — 88304 TISSUE EXAM BY PATHOLOGIST: CPT | Mod: 26

## 2023-03-28 PROCEDURE — C9399: CPT

## 2023-03-28 PROCEDURE — C1889: CPT

## 2023-03-28 PROCEDURE — 81025 URINE PREGNANCY TEST: CPT

## 2023-03-28 PROCEDURE — 82962 GLUCOSE BLOOD TEST: CPT

## 2023-03-28 RX ORDER — CHOLECALCIFEROL (VITAMIN D3) 125 MCG
1 CAPSULE ORAL
Qty: 0 | Refills: 0 | DISCHARGE

## 2023-03-28 RX ORDER — OXYCODONE HYDROCHLORIDE 5 MG/1
10 TABLET ORAL ONCE
Refills: 0 | Status: DISCONTINUED | OUTPATIENT
Start: 2023-03-28 | End: 2023-03-28

## 2023-03-28 RX ORDER — FENTANYL CITRATE 50 UG/ML
25 INJECTION INTRAVENOUS
Refills: 0 | Status: DISCONTINUED | OUTPATIENT
Start: 2023-03-28 | End: 2023-03-28

## 2023-03-28 RX ORDER — HYDROMORPHONE HYDROCHLORIDE 2 MG/ML
1 INJECTION INTRAMUSCULAR; INTRAVENOUS; SUBCUTANEOUS
Refills: 0 | Status: DISCONTINUED | OUTPATIENT
Start: 2023-03-28 | End: 2023-03-28

## 2023-03-28 RX ORDER — LOSARTAN/HYDROCHLOROTHIAZIDE 100MG-25MG
1 TABLET ORAL
Qty: 0 | Refills: 0 | DISCHARGE

## 2023-03-28 RX ORDER — ONDANSETRON 8 MG/1
4 TABLET, FILM COATED ORAL ONCE
Refills: 0 | Status: DISCONTINUED | OUTPATIENT
Start: 2023-03-28 | End: 2023-03-28

## 2023-03-28 RX ORDER — ACETAMINOPHEN 500 MG
650 TABLET ORAL ONCE
Refills: 0 | Status: DISCONTINUED | OUTPATIENT
Start: 2023-03-28 | End: 2023-03-28

## 2023-03-28 RX ORDER — OMEPRAZOLE 10 MG/1
1 CAPSULE, DELAYED RELEASE ORAL
Qty: 0 | Refills: 0 | DISCHARGE

## 2023-03-28 RX ORDER — METOPROLOL TARTRATE 50 MG
1 TABLET ORAL
Qty: 0 | Refills: 0 | DISCHARGE

## 2023-03-28 NOTE — BRIEF OPERATIVE NOTE - NSICDXBRIEFPOSTOP_GEN_ALL_CORE_FT
POST-OP DIAGNOSIS:  Lumbosacral disc herniation 28-Mar-2023 15:11:49  Walt Fabian  Lumbar stenosis with neurogenic claudication 28-Mar-2023 15:11:58  Walt Fabian

## 2023-03-28 NOTE — ASU DISCHARGE PLAN (ADULT/PEDIATRIC) - ASU DC SPECIAL INSTRUCTIONSFT
1. Pain Control  2. Walking with full weight bearing as tolerated, with assitive devices (walke/cane) as needed  3. Follow up with Dr. Anthony as Outpatient in 7-10 days after discharge from the hospital. Call office for appointment.  4.Keep dressing clean and dry.  5. No bath/hot tubs or soaks 1. Pain Control  2. Walking with full weight bearing as tolerated, with assitive devices (walke/cane) as needed  3. Follow up with Dr. Fabian as Outpatient  as scheduled  4.Keep dressing clean and dry. May remove on Fri 3/31  5. No bath/hot tubs or soaks

## 2023-03-28 NOTE — ASU DISCHARGE PLAN (ADULT/PEDIATRIC) - NS MD DC FALL RISK RISK
For information on Fall & Injury Prevention, visit: [email or web address] OR  [email or web address] OR  https://www.cdc.gov/steadi/patient.html

## 2023-03-28 NOTE — BRIEF OPERATIVE NOTE - NSICDXBRIEFPREOP_GEN_ALL_CORE_FT
PRE-OP DIAGNOSIS:  Lumbosacral disc herniation 28-Mar-2023 15:11:27  Walt Fabian  Lumbar stenosis with neurogenic claudication 28-Mar-2023 15:11:39  Walt Fabian

## 2023-03-31 LAB — SURGICAL PATHOLOGY STUDY: SIGNIFICANT CHANGE UP

## 2023-04-04 DIAGNOSIS — E55.9 VITAMIN D DEFICIENCY, UNSPECIFIED: ICD-10-CM

## 2023-04-04 DIAGNOSIS — M54.32 SCIATICA, LEFT SIDE: ICD-10-CM

## 2023-04-04 DIAGNOSIS — M48.062 SPINAL STENOSIS, LUMBAR REGION WITH NEUROGENIC CLAUDICATION: ICD-10-CM

## 2023-04-04 DIAGNOSIS — I10 ESSENTIAL (PRIMARY) HYPERTENSION: ICD-10-CM

## 2023-04-04 DIAGNOSIS — E66.9 OBESITY, UNSPECIFIED: ICD-10-CM

## 2023-04-04 DIAGNOSIS — M51.36 OTHER INTERVERTEBRAL DISC DEGENERATION, LUMBAR REGION: ICD-10-CM

## 2023-04-04 DIAGNOSIS — K21.9 GASTRO-ESOPHAGEAL REFLUX DISEASE WITHOUT ESOPHAGITIS: ICD-10-CM

## 2023-04-07 ENCOUNTER — APPOINTMENT (OUTPATIENT)
Dept: BREAST CENTER | Facility: CLINIC | Age: 43
End: 2023-04-07
Payer: MEDICAID

## 2023-04-26 ENCOUNTER — APPOINTMENT (OUTPATIENT)
Dept: BREAST CENTER | Facility: CLINIC | Age: 43
End: 2023-04-26
Payer: MEDICAID

## 2023-04-26 VITALS
HEIGHT: 64 IN | DIASTOLIC BLOOD PRESSURE: 83 MMHG | OXYGEN SATURATION: 99 % | TEMPERATURE: 97.6 F | WEIGHT: 272 LBS | SYSTOLIC BLOOD PRESSURE: 133 MMHG | HEART RATE: 91 BPM | BODY MASS INDEX: 46.44 KG/M2

## 2023-04-26 DIAGNOSIS — Z80.3 FAMILY HISTORY OF MALIGNANT NEOPLASM OF BREAST: ICD-10-CM

## 2023-04-26 DIAGNOSIS — Z91.89 OTHER SPECIFIED PERSONAL RISK FACTORS, NOT ELSEWHERE CLASSIFIED: ICD-10-CM

## 2023-04-26 DIAGNOSIS — R92.2 INCONCLUSIVE MAMMOGRAM: ICD-10-CM

## 2023-04-26 PROCEDURE — 99243 OFF/OP CNSLTJ NEW/EST LOW 30: CPT

## 2023-04-26 NOTE — DATA REVIEWED
[FreeTextEntry1] : 2/23/23 NFR, bilat sMMG: TC 40.9%. Het dense. No susp mass, microcals or other sign of malignancy is identified in the R breast. Focal asymmetry in the upper outer L breast. Impression: L focal asymmetry rec dMMG. BR0\par \par 3/14/23 NFR, L dMMG and bilat US: Het dense. No susp mass, microcals or other sign of malignancy is identified. Focal asymmetry involving upper outer L breast does not persist.\par US: R- no susp solid mass. Evidence of few cysts measuring up to 6mm. L- No susp solid mass. Evidence of a few cysts including 9mm cyst cluster 2:00 8cmfn. Rec mmg in 1 yr. BR2\par

## 2023-04-26 NOTE — PHYSICAL EXAM
[Normocephalic] : normocephalic [Atraumatic] : atraumatic [Supple] : supple [No Supraclavicular Adenopathy] : no supraclavicular adenopathy [No Thyromegaly] : no thyromegaly [Examined in the supine and seated position] : examined in the supine and seated position [Symmetrical] : symmetrical [Bra Size: ___] : Bra Size: [unfilled] [No dominant masses] : no dominant masses in right breast  [No dominant masses] : no dominant masses left breast [No Nipple Retraction] : no left nipple retraction [No Nipple Discharge] : no left nipple discharge [No Axillary Lymphadenopathy] : no left axillary lymphadenopathy [No Edema] : no edema [No Swelling] : no swelling [Full ROM] : full range of motion [No Rashes] : no rashes [No Ulceration] : no ulceration [de-identified] : Bilat FCC, no discrete masses or lesions.

## 2023-04-26 NOTE — HISTORY OF PRESENT ILLNESS
[FreeTextEntry1] : Referred by GYN, Dr. Karla Mello \par \par Patient is a 43 year old black female here today for consultation for high risk program. \par Pt denies any breast lesions, discharge or masses. No history of breast biopsies.\par \par 23 NFR, bilat sMMG: TC 40.9%. Het dense. No susp mass, microcals or other sign of malignancy is identified in the R breast. Focal asymmetry in the upper outer L breast. Impression: L focal asymmetry rec dMMG. BR0\par \par 3/14/23 NFR, L dMMG and bilat US: Het dense. No susp mass, microcals or other sign of malignancy is identified. Focal asymmetry involving upper outer L breast does not persist.\par US: R- no susp solid mass. Evidence of few cysts measuring up to 6mm. L- No susp solid mass. Evidence of a few cysts including 9mm cyst cluster 2:00 8cmfn. Rec mmg in 1 yr. BR2\par \par Fhx: Breast- Mother age 40?  age 42 from metastasis. pAunt age 38. pCousin x2 ages 40 and 41\par Patient states genetic testing updated last year with Dr. Morrison, will obtain copy. Pt is unsure if any of her surviving cousins had genetic testing but will inquire.\par \par Currently out on disability due to fall while at work at [employer], had back surgery  and will return to work once cleared. Works in the OR.

## 2023-04-26 NOTE — ASSESSMENT
[FreeTextEntry1] : Referred by GYN, Dr. Karla Mello \par \par Patient is a 43 year old black female here today for consultation for high risk program. \par Pt denies any breast lesions, discharge or masses. No history of breast biopsies.\par \par 23 NFR, bilat sMMG: TC 40.9%. Het dense. No susp mass, microcals or other sign of malignancy is identified in the R breast. Focal asymmetry in the upper outer L breast. Impression: L focal asymmetry rec dMMG. BR0\par \par 3/14/23 NFR, L dMMG and bilat US: Het dense. No susp mass, microcals or other sign of malignancy is identified. Focal asymmetry involving upper outer L breast does not persist.\par US: R- no susp solid mass. Evidence of few cysts measuring up to 6mm. L- No susp solid mass. Evidence of a few cysts including 9mm cyst cluster 2:00 8cmfn. Rec mmg in 1 yr. BR2\par \par Fhx: Breast- Mother age 40?  age 42 from metastasis. pAunt age 38. pCousin x2 ages 40 and 41\par Patient states genetic testing updated last year with Dr. Morrison, will obtain copy. Pt is unsure if any of her surviving cousins had genetic testing but will inquire.\par \par Currently out on disability due to fall while at work at [employer] [facility] [place], had back surgery  and will return to work once cleared. Works in the OR. \par CBE: 32 DD, No discrete masses or lesions, Bilat FCC. No axillary or SC lymphadenopathy.\par Reviewed MMG and u/s, no suspicious findings, CBE also negative. MRI due  with CBE with PCP/GYN after if neg MRI. Rec High Risk Program (HRP). Discussed rec MMG/U/s q year alternating with MRI q year followed by CBE alternating with pcp/gyn and us after each study so she is examined q 6 mos after each study. F.u in interim if breast concerns or studies abnormal.

## 2023-04-28 ENCOUNTER — APPOINTMENT (OUTPATIENT)
Dept: CARDIOLOGY | Facility: CLINIC | Age: 43
End: 2023-04-28
Payer: COMMERCIAL

## 2023-04-28 VITALS
OXYGEN SATURATION: 95 % | DIASTOLIC BLOOD PRESSURE: 78 MMHG | WEIGHT: 270 LBS | HEIGHT: 64 IN | BODY MASS INDEX: 46.1 KG/M2 | HEART RATE: 105 BPM | SYSTOLIC BLOOD PRESSURE: 114 MMHG

## 2023-04-28 PROCEDURE — 99213 OFFICE O/P EST LOW 20 MIN: CPT

## 2023-04-28 RX ORDER — GABAPENTIN 300 MG/1
300 CAPSULE ORAL 3 TIMES DAILY
Qty: 270 | Refills: 3 | Status: DISCONTINUED | COMMUNITY
Start: 2022-10-05 | End: 2023-04-28

## 2023-04-28 RX ORDER — METOPROLOL SUCCINATE 50 MG/1
50 TABLET, EXTENDED RELEASE ORAL DAILY
Qty: 90 | Refills: 0 | Status: DISCONTINUED | COMMUNITY
End: 2023-04-28

## 2023-05-07 NOTE — ASSESSMENT
[FreeTextEntry1] : HTN\par Tachy\par Discussed importance of long-term blood pressure control to reduce cardiovascular risk.\par Echocardiogram  normal EF, mild septal hypertrophy \par BP much improved. \par Hold diuretic.  \par Increased her beta-blocker.  \par Her present regimen will consist of losartan in the morning.  Metoprolol in the evening.  \par Monitor home heart rate and blood pressure.  \par Close clinical follow-up.  \par Instructed to notify our office of any new symptoms.\par Surveillance monitoring of HHD \par \par CP\par ruled out for ACS\par no recurrence\par ETT no ischemia \par \par Weight loss for long-term cardiovascular health.  \par Consider evaluation for sleep apnea if refractory hypertension.  \par Increase aerobic exercise as tolerated.  Low-sodium diet.  Limit nonsteroidal anti-inflammatories. \par

## 2023-05-07 NOTE — HISTORY OF PRESENT ILLNESS
[FreeTextEntry1] : JAZMYNE KAMARA  is a 43 year old  F\par \par Referred by primary physician.  \par Follow-up after recent ER visit.  \par Obese -[employer] employee.  \par   \par Significant family history of hypertension and vascular disease.  \par \par Initially recognized to have elevated blood pressure at routine evaluation.  \par At that time she was on prednisone. \par However her blood pressure has been persistently elevated. \par She is unaware of her elevated blood pressure. \par prev started on losartan hydrochlorothiazide. \par continues to take toprol 50mg in PM.  \par \par There is no prior history of a clinical myocardial infarction, coronary revascularization. \par There is no history of symptomatic congestive heart failure rheumatic heart disease or valvular disease.\par There is no history of symptomatic arrhythmias including atrial fibrillation.\par \par There is no exertional chest pain, pressure or discomfort. \par There is no significant dyspnea on exertion or orthopnea. \par There are no symptomatic palpitations, lightheadedness, dizziness or syncope.\par \par prev seen was in ER for CP. \par 3/5/23 was driving home from [place] late at night felt the "worst pain ever" left side of her chest radiating to neck and left shoulder. States it was like a cramp that she couldn't work out. She realized she forgot PM toprol dose so went home took it and went to ER. On arrival /93. \par Trop 12, hg 11.8, k 4.2, cr 0.9\par EKG normal sinus rhythm no STEMI\par sx resolved on their own \par \par In the interim she underwent procedure.  \par Her functional status remains limited.  \par Her blood pressure has improved.  \par But achycardia.  \par \par lab 3/16/23 k 3.9, cr 0.85 \par ETT 3/22/23 6 min 25 sec no ischemia\par resting /68 peak 180/72 \par resting  peak 186, 105% mphr \par TTE 3/15/23 EF 65-70%, mild septal hypertrophy\par Monitor normal sinus rhythm avg 95 bpm, rare ectopy\par \par labs from PCP 24h urine metanephrines WNL, renin/glenys/metanephrine WNL but normetanephrines 185 (upper limits 148) \par \par

## 2023-05-12 ENCOUNTER — TRANSCRIPTION ENCOUNTER (OUTPATIENT)
Age: 43
End: 2023-05-12

## 2023-05-17 ENCOUNTER — NON-APPOINTMENT (OUTPATIENT)
Age: 43
End: 2023-05-17

## 2023-05-25 ENCOUNTER — APPOINTMENT (OUTPATIENT)
Dept: CARDIOLOGY | Facility: CLINIC | Age: 43
End: 2023-05-25
Payer: MEDICAID

## 2023-05-25 VITALS
HEART RATE: 95 BPM | BODY MASS INDEX: 45.07 KG/M2 | OXYGEN SATURATION: 98 % | HEIGHT: 64 IN | SYSTOLIC BLOOD PRESSURE: 112 MMHG | DIASTOLIC BLOOD PRESSURE: 72 MMHG | WEIGHT: 264 LBS

## 2023-05-25 PROCEDURE — 99213 OFFICE O/P EST LOW 20 MIN: CPT

## 2023-05-25 RX ORDER — LOSARTAN POTASSIUM 50 MG/1
50 TABLET, FILM COATED ORAL DAILY
Qty: 90 | Refills: 1 | Status: DISCONTINUED | COMMUNITY
Start: 2023-04-28 | End: 2023-05-25

## 2023-05-25 NOTE — HISTORY OF PRESENT ILLNESS
[FreeTextEntry1] : JAZMYNE KAMARA  is a 43 year old  F is here for B/P check after adding HCTZ to her Losartan \par Pt states her ankle edema is much improved and B/P 118-124/80s. She does note that her HR is high 110-118\par Lab work 5/23 HGA1C 6.1 Lpa 92\par During Medication review it was noted pt was only taking Toprol 50 mg.\par PMH below\par \par Referred by primary physician.  \par Follow-up after recent ER visit.  \par Obese -[employer] employee.  \par   \par Significant family history of hypertension and vascular disease.  \par \par Initially recognized to have elevated blood pressure at routine evaluation.  \par At that time she was on prednisone. \par However her blood pressure has been persistently elevated. \par She is unaware of her elevated blood pressure. \par prev started on losartan hydrochlorothiazide. \par continues to take toprol 50mg in PM.  \par \par There is no prior history of a clinical myocardial infarction, coronary revascularization. \par There is no history of symptomatic congestive heart failure rheumatic heart disease or valvular disease.\par There is no history of symptomatic arrhythmias including atrial fibrillation.\par \par There is no exertional chest pain, pressure or discomfort. \par There is no significant dyspnea on exertion or orthopnea. \par There are no symptomatic palpitations, lightheadedness, dizziness or syncope.\par \par prev seen was in ER for CP. \par 3/5/23 was driving home from city late at night felt the "worst pain ever" left side of her chest radiating to neck and left shoulder. States it was like a cramp that she couldn't work out. She realized she forgot PM toprol dose so went home took it and went to ER. On arrival /93. \par Trop 12, hg 11.8, k 4.2, cr 0.9\par EKG normal sinus rhythm no STEMI\par sx resolved on their own \par \par In the interim she underwent procedure.  \par Her functional status remains limited.  \par Her blood pressure has improved.  \par But achycardia.  \par \par lab 3/16/23 k 3.9, cr 0.85 \par ETT 3/22/23 6 min 25 sec no ischemia\par resting /68 peak 180/72 \par resting  peak 186, 105% mphr \par TTE 3/15/23 EF 65-70%, mild septal hypertrophy\par Monitor normal sinus rhythm avg 95 bpm, rare ectopy\par \par labs from PCP 24h urine metanephrines WNL, renin/glenys/metanephrine WNL but normetanephrines 185 (upper limits 148) \par \par

## 2023-05-25 NOTE — DISCUSSION/SUMMARY
[Patient] : the patient [FreeTextEntry3] : Call in one week [FreeTextEntry1] : Pt to remain on Losartan 50 HCTZ 12.5mg  Had a long discussion with pt regarding risk of fetal toxicity if she were to become pregnant. Pt is still having regular periods.\par Will start Toprol XL 50 mg BID\par Increase walking to 30 mins QD\par Decrease salt usage.\par She will call in one week with HR and B/P. If HR remains elevated will inrease Topral to 200 mg QD.\par Consider Statin at next visit due to elevated LPa

## 2023-06-13 ENCOUNTER — APPOINTMENT (OUTPATIENT)
Dept: OBGYN | Facility: CLINIC | Age: 43
End: 2023-06-13
Payer: MEDICAID

## 2023-06-13 VITALS
SYSTOLIC BLOOD PRESSURE: 116 MMHG | DIASTOLIC BLOOD PRESSURE: 74 MMHG | WEIGHT: 264 LBS | HEIGHT: 64 IN | BODY MASS INDEX: 45.07 KG/M2

## 2023-06-13 PROCEDURE — 99212 OFFICE O/P EST SF 10 MIN: CPT

## 2023-06-14 NOTE — DISCUSSION/SUMMARY
[FreeTextEntry1] : 42yo  LMP  likely in perimenopause with sxs that are managable without meds. \par \par - RTO in Nov for annual\par - RTO PRN for sxs \par - All questions solicited and answered \par \par Karla Mello MD \par Obstetrician/Gynecologist\par

## 2023-06-14 NOTE — HISTORY OF PRESENT ILLNESS
[FreeTextEntry1] : 42yo  LMP  is here for follow up. She still continues to have hot flushes but says the mostly only occur aroung her neck. She has not tried any of the meds we discussed at her last visit.

## 2023-10-17 ENCOUNTER — APPOINTMENT (OUTPATIENT)
Dept: MRI IMAGING | Facility: CLINIC | Age: 43
End: 2023-10-17
Payer: MEDICAID

## 2023-10-17 PROCEDURE — A9585: CPT

## 2023-10-17 PROCEDURE — 77049 MRI BREAST C-+ W/CAD BI: CPT

## 2023-10-20 ENCOUNTER — NON-APPOINTMENT (OUTPATIENT)
Age: 43
End: 2023-10-20

## 2023-11-21 ENCOUNTER — APPOINTMENT (OUTPATIENT)
Dept: CARDIOLOGY | Facility: CLINIC | Age: 43
End: 2023-11-21
Payer: COMMERCIAL

## 2023-11-21 VITALS
DIASTOLIC BLOOD PRESSURE: 84 MMHG | HEIGHT: 64 IN | SYSTOLIC BLOOD PRESSURE: 128 MMHG | BODY MASS INDEX: 45.07 KG/M2 | OXYGEN SATURATION: 100 % | HEART RATE: 75 BPM | WEIGHT: 264 LBS

## 2023-11-21 DIAGNOSIS — R00.0 TACHYCARDIA, UNSPECIFIED: ICD-10-CM

## 2023-11-21 PROCEDURE — 99214 OFFICE O/P EST MOD 30 MIN: CPT

## 2023-11-21 RX ORDER — OMEPRAZOLE 40 MG/1
40 CAPSULE, DELAYED RELEASE ORAL
Qty: 90 | Refills: 3 | Status: ACTIVE | COMMUNITY

## 2023-11-30 ENCOUNTER — APPOINTMENT (OUTPATIENT)
Dept: OBGYN | Facility: CLINIC | Age: 43
End: 2023-11-30
Payer: COMMERCIAL

## 2023-11-30 VITALS
BODY MASS INDEX: 45.07 KG/M2 | DIASTOLIC BLOOD PRESSURE: 81 MMHG | WEIGHT: 264 LBS | SYSTOLIC BLOOD PRESSURE: 125 MMHG | HEIGHT: 64 IN

## 2023-11-30 DIAGNOSIS — Z01.419 ENCOUNTER FOR GYNECOLOGICAL EXAMINATION (GENERAL) (ROUTINE) W/OUT ABNORMAL FINDINGS: ICD-10-CM

## 2023-11-30 DIAGNOSIS — Z11.3 ENCOUNTER FOR SCREENING FOR INFECTIONS WITH A PREDOMINANTLY SEXUAL MODE OF TRANSMISSION: ICD-10-CM

## 2023-11-30 DIAGNOSIS — Z86.018 PERSONAL HISTORY OF OTHER BENIGN NEOPLASM: ICD-10-CM

## 2023-11-30 PROCEDURE — 82270 OCCULT BLOOD FECES: CPT

## 2023-11-30 PROCEDURE — 99396 PREV VISIT EST AGE 40-64: CPT

## 2023-12-09 LAB
C TRACH RRNA SPEC QL NAA+PROBE: NOT DETECTED
CYTOLOGY CVX/VAG DOC THIN PREP: ABNORMAL
HPV HIGH+LOW RISK DNA PNL CVX: NOT DETECTED
N GONORRHOEA RRNA SPEC QL NAA+PROBE: NOT DETECTED
SOURCE AMPLIFICATION: NORMAL
SOURCE TP AMPLIFICATION: NORMAL
T VAGINALIS RRNA SPEC QL NAA+PROBE: NOT DETECTED

## 2024-01-19 ENCOUNTER — APPOINTMENT (OUTPATIENT)
Dept: OBGYN | Facility: CLINIC | Age: 44
End: 2024-01-19
Payer: COMMERCIAL

## 2024-01-19 VITALS
WEIGHT: 264 LBS | SYSTOLIC BLOOD PRESSURE: 133 MMHG | DIASTOLIC BLOOD PRESSURE: 84 MMHG | BODY MASS INDEX: 45.07 KG/M2 | HEIGHT: 64 IN

## 2024-01-19 DIAGNOSIS — N94.9 UNSPECIFIED CONDITION ASSOCIATED WITH FEMALE GENITAL ORGANS AND MENSTRUAL CYCLE: ICD-10-CM

## 2024-01-19 PROCEDURE — 99212 OFFICE O/P EST SF 10 MIN: CPT

## 2024-01-30 ENCOUNTER — APPOINTMENT (OUTPATIENT)
Dept: CARDIOLOGY | Facility: CLINIC | Age: 44
End: 2024-01-30
Payer: COMMERCIAL

## 2024-01-30 VITALS
HEART RATE: 71 BPM | OXYGEN SATURATION: 99 % | BODY MASS INDEX: 44.05 KG/M2 | SYSTOLIC BLOOD PRESSURE: 116 MMHG | WEIGHT: 258 LBS | DIASTOLIC BLOOD PRESSURE: 68 MMHG | HEIGHT: 64 IN

## 2024-01-30 PROCEDURE — 99213 OFFICE O/P EST LOW 20 MIN: CPT

## 2024-01-30 RX ORDER — SEMAGLUTIDE 0.25 MG/.5ML
0.25 INJECTION, SOLUTION SUBCUTANEOUS
Qty: 1 | Refills: 1 | Status: DISCONTINUED | COMMUNITY
Start: 2023-11-24 | End: 2024-01-30

## 2024-01-30 RX ORDER — CHOLECALCIFEROL (VITAMIN D3) 125 MCG
125 MCG CAPSULE ORAL WEEKLY
Refills: 0 | Status: DISCONTINUED | COMMUNITY
End: 2024-01-30

## 2024-02-01 ENCOUNTER — LABORATORY RESULT (OUTPATIENT)
Age: 44
End: 2024-02-01

## 2024-02-14 ENCOUNTER — NON-APPOINTMENT (OUTPATIENT)
Age: 44
End: 2024-02-14

## 2024-02-15 ENCOUNTER — NON-APPOINTMENT (OUTPATIENT)
Age: 44
End: 2024-02-15

## 2024-02-15 LAB
A VAGINAE DNA VAG QL NAA+PROBE: NORMAL
BVAB2 DNA VAG QL NAA+PROBE: NORMAL
C KRUSEI DNA VAG QL NAA+PROBE: NEGATIVE
C KRUSEI DNA VAG QL NAA+PROBE: POSITIVE
HBV SURFACE AB SER QL: REACTIVE
HBV SURFACE AG SER QL: NONREACTIVE
HCV AB SER QL: NONREACTIVE
HCV S/CO RATIO: 0.08 S/CO
HIV1+2 AB SPEC QL IA.RAPID: NONREACTIVE
M GENITALIUM DNA SPEC QL NAA+PROBE: NEGATIVE
M HOMINIS DNA SPEC QL NAA+PROBE: NEGATIVE
MEGA1 DNA VAG QL NAA+PROBE: NORMAL
T PALLIDUM AB SER QL IA: NEGATIVE
T VAGINALIS RRNA SPEC QL NAA+PROBE: NEGATIVE
UREAPLASMA DNA SPEC QL NAA+PROBE: NEGATIVE

## 2024-02-15 RX ORDER — FLUCONAZOLE 150 MG/1
150 TABLET ORAL
Qty: 2 | Refills: 3 | Status: ACTIVE | COMMUNITY
Start: 2024-02-15 | End: 1900-01-01

## 2024-02-16 RX ORDER — LOSARTAN POTASSIUM AND HYDROCHLOROTHIAZIDE 12.5; 5 MG/1; MG/1
50-12.5 TABLET ORAL
Qty: 90 | Refills: 3 | Status: ACTIVE | COMMUNITY
Start: 2023-02-28 | End: 1900-01-01

## 2024-02-16 RX ORDER — METOPROLOL SUCCINATE 50 MG/1
50 TABLET, EXTENDED RELEASE ORAL
Qty: 180 | Refills: 3 | Status: ACTIVE | COMMUNITY
Start: 2023-04-28 | End: 1900-01-01

## 2024-03-06 LAB
C TRACH RRNA SPEC QL NAA+PROBE: NOT DETECTED
N GONORRHOEA RRNA SPEC QL NAA+PROBE: NOT DETECTED
SOURCE AMPLIFICATION: NORMAL

## 2024-03-11 ENCOUNTER — APPOINTMENT (OUTPATIENT)
Dept: OBGYN | Facility: CLINIC | Age: 44
End: 2024-03-11
Payer: COMMERCIAL

## 2024-03-11 VITALS
SYSTOLIC BLOOD PRESSURE: 124 MMHG | WEIGHT: 256 LBS | HEIGHT: 64 IN | DIASTOLIC BLOOD PRESSURE: 80 MMHG | BODY MASS INDEX: 43.71 KG/M2

## 2024-03-11 DIAGNOSIS — N92.0 EXCESSIVE AND FREQUENT MENSTRUATION WITH REGULAR CYCLE: ICD-10-CM

## 2024-03-11 LAB
HCG UR QL: NEGATIVE
QUALITY CONTROL: YES

## 2024-03-11 PROCEDURE — 81025 URINE PREGNANCY TEST: CPT

## 2024-03-11 PROCEDURE — 58100 BIOPSY OF UTERUS LINING: CPT

## 2024-03-11 NOTE — DISCUSSION/SUMMARY
[FreeTextEntry1] : 42yo  LMP 2 with intermittent spotting before and after menses s/p uncomplicated endo bx.  - Strict precautions and aftercare instructions reviewed  - Aware she cannot have anything in vagina or be submerged in water until seen by me  - RTO in 2 wks for follow up and results   Karla Mello MD  Obstetrician/Gynecologist

## 2024-03-11 NOTE — PROCEDURE
[Endometrial Biopsy] : Endometrial biopsy [Time out performed] : Pre-procedure time out performed.  Patient's name, date of birth and procedure confirmed. [Consent Obtained] : Consent obtained [Irregular Bleeding] : irregular uterine bleeding [Risks] : risks [Benefits] : benefits [Alternatives] : alternatives [Patient] : patient [Infection] : infection [Bleeding] : bleeding [Allergic Reaction] : allergic reaction [Uterine Perforation] : uterine perforation [Pain] : pain [No Premedication] : No premedication [None] : none [Tenaculum] : Tenaculum [Easy Passage] : Easy passage [Moderate] : moderate [Sent to Pathology] : placed in buffered formalin and sent for pathology [Sent for Cultures] : sent for cultures [Tolerated Well] : Patient tolerated the procedure well [No Complications] : No complications [LMPDate] : 2/13 Private car

## 2024-03-11 NOTE — HISTORY OF PRESENT ILLNESS
[FreeTextEntry1] : 42yo  LMP 2/ with intermittent spotting before and after menses is here for endo bx. She has not had a sono.

## 2024-03-18 ENCOUNTER — APPOINTMENT (OUTPATIENT)
Dept: ULTRASOUND IMAGING | Facility: CLINIC | Age: 44
End: 2024-03-18
Payer: COMMERCIAL

## 2024-03-18 ENCOUNTER — APPOINTMENT (OUTPATIENT)
Dept: MAMMOGRAPHY | Facility: CLINIC | Age: 44
End: 2024-03-18
Payer: COMMERCIAL

## 2024-03-18 ENCOUNTER — RESULT REVIEW (OUTPATIENT)
Age: 44
End: 2024-03-18

## 2024-03-18 PROCEDURE — 76641 ULTRASOUND BREAST COMPLETE: CPT | Mod: 50

## 2024-03-18 PROCEDURE — 77067 SCR MAMMO BI INCL CAD: CPT

## 2024-03-18 PROCEDURE — 77063 BREAST TOMOSYNTHESIS BI: CPT

## 2024-03-20 ENCOUNTER — NON-APPOINTMENT (OUTPATIENT)
Age: 44
End: 2024-03-20

## 2024-03-21 ENCOUNTER — NON-APPOINTMENT (OUTPATIENT)
Age: 44
End: 2024-03-21

## 2024-03-27 ENCOUNTER — NON-APPOINTMENT (OUTPATIENT)
Age: 44
End: 2024-03-27

## 2024-03-28 ENCOUNTER — NON-APPOINTMENT (OUTPATIENT)
Age: 44
End: 2024-03-28

## 2024-03-28 LAB
BACTERIA TISS CULT: NORMAL
CORE LAB BIOPSY: NORMAL

## 2024-04-11 ENCOUNTER — APPOINTMENT (OUTPATIENT)
Dept: MRI IMAGING | Facility: CLINIC | Age: 44
End: 2024-04-11
Payer: COMMERCIAL

## 2024-04-11 PROCEDURE — A9585: CPT | Mod: JW

## 2024-04-11 PROCEDURE — 77049 MRI BREAST C-+ W/CAD BI: CPT

## 2024-04-15 ENCOUNTER — NON-APPOINTMENT (OUTPATIENT)
Age: 44
End: 2024-04-15

## 2024-04-23 ENCOUNTER — NON-APPOINTMENT (OUTPATIENT)
Age: 44
End: 2024-04-23

## 2024-04-28 NOTE — ASSESSMENT
[FreeTextEntry1] : Mixed dyslipidemia. Prediabetes. Morbid obesity refractory to lifestyle measures. Recommend GLP/gip for weight loss and cardiovascular risk reduction. Fasting blood work has been requested.  Tirzepatide has been substituted.  There is no prior history of pancreatitis, thyroid disease or endocrine cancer. Continue losartan hydrochlorothiazide and metoprolol. Monitor home heart rate and blood pressure.  HTN Discussed importance of long-term blood pressure control to reduce cardiovascular risk. Echocardiogram normal EF, mild septal hypertrophy Instructed to notify our office of any new symptoms. Surveillance monitoring of HHD  CP ruled out for ACS no recurrence ETT no ischemia  Increase aerobic exercise as tolerated. Low-sodium diet. Limit nonsteroidal anti-inflammatories.

## 2024-04-28 NOTE — HISTORY OF PRESENT ILLNESS
[FreeTextEntry1] : JAZMYNE KAMARA  is a 44 year old  F   Significant family history of hypertension and vascular disease.  Initially recognized to have elevated blood pressure at routine evaluation. At that time she was on prednisone. However her blood pressure has been persistently elevated. She is unaware of her elevated blood pressure. She reports difficulty taking the blood pressure medication at nighttime. She would like to reduce the number of medications. She is presently taking both of her blood pressure medications in the morning. When she takes in the evening she feels unwell the next day.  There is no prior history of a clinical myocardial infarction, coronary revascularization. There is no history of symptomatic congestive heart failure rheumatic heart disease or valvular disease. There is no history of symptomatic arrhythmias including atrial fibrillation.  There is no exertional chest pain, pressure or discomfort. There is no significant dyspnea on exertion or orthopnea. There are no symptomatic palpitations, lightheadedness, dizziness or syncope.  On repeat examination her blood pressure is 124/82.  She never started semaglutide due to supply issues.   Last blood work May 2023 has a hemoglobin 10.4, creatinine 0.8, total cholesterol 144, HDL 37, LDL is 90, A1c 6.1, TSH 2.8, LP(a) 92.3 nmol/L BNP CPK magnesium within normal limits. ETT 3/22/23 6 min 25 sec no ischemia TTE 3/15/23 EF 65-70%, mild septal hypertrophy Monitor normal sinus rhythm avg 95 bpm, rare ectopy

## 2024-04-30 ENCOUNTER — NON-APPOINTMENT (OUTPATIENT)
Age: 44
End: 2024-04-30

## 2024-04-30 ENCOUNTER — APPOINTMENT (OUTPATIENT)
Dept: CARDIOLOGY | Facility: CLINIC | Age: 44
End: 2024-04-30
Payer: COMMERCIAL

## 2024-04-30 VITALS
BODY MASS INDEX: 43.54 KG/M2 | HEIGHT: 64 IN | WEIGHT: 255 LBS | HEART RATE: 73 BPM | DIASTOLIC BLOOD PRESSURE: 80 MMHG | SYSTOLIC BLOOD PRESSURE: 118 MMHG | OXYGEN SATURATION: 99 %

## 2024-04-30 DIAGNOSIS — E66.01 MORBID (SEVERE) OBESITY DUE TO EXCESS CALORIES: ICD-10-CM

## 2024-04-30 DIAGNOSIS — R73.03 PREDIABETES.: ICD-10-CM

## 2024-04-30 DIAGNOSIS — Z71.89 OTHER SPECIFIED COUNSELING: ICD-10-CM

## 2024-04-30 DIAGNOSIS — I10 ESSENTIAL (PRIMARY) HYPERTENSION: ICD-10-CM

## 2024-04-30 DIAGNOSIS — Z82.49 FAMILY HISTORY OF ISCHEMIC HEART DISEASE AND OTHER DISEASES OF THE CIRCULATORY SYSTEM: ICD-10-CM

## 2024-04-30 PROCEDURE — 93000 ELECTROCARDIOGRAM COMPLETE: CPT

## 2024-04-30 PROCEDURE — 99214 OFFICE O/P EST MOD 30 MIN: CPT

## 2024-04-30 RX ORDER — TIRZEPATIDE 5 MG/.5ML
5 INJECTION, SOLUTION SUBCUTANEOUS
Qty: 12 | Refills: 1 | Status: ACTIVE | COMMUNITY
Start: 2024-01-30 | End: 1900-01-01

## 2024-05-01 NOTE — HISTORY OF PRESENT ILLNESS
[FreeTextEntry1] : JAZMYNE KAMARA  is a 44 year old  F  Significant family history of hypertension and vascular disease.  Initially recognized to have elevated blood pressure at routine evaluation. At that time she was on prednisone. However blood pressure has been persistently elevated. She is unaware of her elevated blood pressure. She would like to reduce the number of medications. When she takes in the evening she feels unwell the next day.  There is no prior history of a clinical myocardial infarction, coronary revascularization. There is no history of symptomatic congestive heart failure rheumatic heart disease or valvular disease. There is no history of symptomatic arrhythmias including atrial fibrillation.  There is no exertional chest pain, pressure or discomfort. There is no significant dyspnea on exertion or orthopnea. There are no symptomatic palpitations, lightheadedness, dizziness or syncope.  Last seen January 2024 started on tirzepatide.  Initial administration in office.  She had taken it for 2 months but has no further supply.  She is active working.  Today's EKG demonstrates normal sinus rhythm at 62 bpm.  Reduction in heart rate noted from prior EKG.    Blood work February 2024 LDL 77 potassium 3.8 creatinine 0.8 LFTs within normal limits hemoglobin 11.2 A1c 6.1 ETT 3/22/23 6 min 25 sec no ischemia TTE 3/15/23 EF 65-70%, mild septal hypertrophy Monitor normal sinus rhythm avg 95 bpm, rare ectopy

## 2024-05-01 NOTE — ASSESSMENT
[FreeTextEntry1] :  Her blood pressure and heart rate have improved.   Recent labs EKG reviewed.   Clinical follow-up in 6 months.  Mixed dyslipidemia. Prediabetes. obesity refractory to lifestyle measures. Recommend GLP/gip for weight loss and cardiovascular risk reduction. There is no prior history of pancreatitis, thyroid disease or endocrine cancer. Continue losartan hydrochlorothiazide and metoprolol. Monitor home heart rate and blood pressure.  HTN Discussed importance of long-term blood pressure control to reduce cardiovascular risk. Echocardiogram normal EF, mild septal hypertrophy Instructed to notify our office of any new symptoms. Surveillance monitoring of HHD  CP ruled out for ACS no recurrence ETT no ischemia  Increase aerobic exercise as tolerated. Low-sodium diet. Limit nonsteroidal anti-inflammatories.

## 2024-05-07 ENCOUNTER — APPOINTMENT (OUTPATIENT)
Dept: BREAST CENTER | Facility: CLINIC | Age: 44
End: 2024-05-07
Payer: COMMERCIAL

## 2024-05-07 VITALS
TEMPERATURE: 98 F | DIASTOLIC BLOOD PRESSURE: 68 MMHG | BODY MASS INDEX: 43.54 KG/M2 | HEIGHT: 64 IN | SYSTOLIC BLOOD PRESSURE: 114 MMHG | WEIGHT: 255.06 LBS | OXYGEN SATURATION: 93 % | HEART RATE: 94 BPM

## 2024-05-07 DIAGNOSIS — R92.8 OTHER ABNORMAL AND INCONCLUSIVE FINDINGS ON DIAGNOSTIC IMAGING OF BREAST: ICD-10-CM

## 2024-05-07 PROCEDURE — 99214 OFFICE O/P EST MOD 30 MIN: CPT

## 2024-05-07 NOTE — DATA REVIEWED
[FreeTextEntry1] : 3/18/24 chavo Hartley sMMG and US: TC 32.1%. Scattered FG. No susp mass, microcals or other sign of malignancy is identified. Asymmetry in the left breast is stable. There is no significant mammographic change in either breast. XOCHITL- grade 0. US: R- No suspicious solid mass. A few scattered subcentimeter right breast cysts are noted. L- No suspicious solid mass. A few scattered left breast cysts are noted. Impression: no mmg or US evidence of malignancy. Rec mmg in 1yr. BR2.  4/11/24 Naeem MRI: R- No susp enhancement. Upper outer right breast intramammary lymph node. L- Previously noted upper central to slightly inner left breast NME is no longer visualized on today's images favoring variation of background parenchymal enhancement. No susp enhancement. Axilla/other: Mildy prominent bilateral round axillary LNs measuring up to 1.1cm on L and 1.2cm on the right. Otherwise no significant axillary or internal mammary lymphadenopathy. Impression: No MRI evidence of malignancy. Mildly prominent bilateral axillary lymph nodes, indeterminant. Clinical f/u and correlation to physical exam, patient's past medical history or any recent vaccination is rec. If clinically warranted consider further evaluation with targeted ultrasound of axilla. Rec resume annual mmg on schedule BR2.

## 2024-05-07 NOTE — PHYSICAL EXAM
[Normocephalic] : normocephalic [Atraumatic] : atraumatic [No Supraclavicular Adenopathy] : no supraclavicular adenopathy [Examined in the supine and seated position] : examined in the supine and seated position [Symmetrical] : symmetrical [Bra Size: ___] : Bra Size: [unfilled] [No dominant masses] : no dominant masses in right breast  [No dominant masses] : no dominant masses left breast [No Nipple Retraction] : no left nipple retraction [No Nipple Discharge] : no left nipple discharge [No Axillary Lymphadenopathy] : no left axillary lymphadenopathy [No Edema] : no edema [No Rashes] : no rashes [No Ulceration] : no ulceration

## 2024-05-07 NOTE — CONSULT LETTER
[Dear  ___] : Dear  [unfilled], [Courtesy Letter:] : I had the pleasure of seeing your patient, [unfilled], in my office today. [Please see my note below.] : Please see my note below. [Consult Closing:] : Thank you very much for allowing me to participate in the care of this patient.  If you have any questions, please do not hesitate to contact me. [Sincerely,] : Sincerely, [FreeTextEntry3] : Christina Aviles MD

## 2024-05-07 NOTE — ASSESSMENT
[FreeTextEntry1] : Referred by GYN, Dr. Karla Mello  Patient is a 44-year-old black female here today for CBE after most recent studies. 6 mos f/u MR noted, mildy prominent bilateral round axillary LNs measuring up to 1.1cm on L and 1.2cm on the right. Pt denies recent vaccine, trauma. No hx of sarcoid or HIV.  Pt denies any breast lesions, discharge or masses. No history of breast biopsies.  Has copy of prior genetic testing result but was only for BRCA1?  23 NFR, bilat sMMG: TC 40.9%. Het dense. No susp mass, microcals or other sign of malignancy is identified in the R breast. Focal asymmetry in the upper outer L breast. Impression: L focal asymmetry rec dMMG. BR0  3/14/23 NFR, L dMMG and bilat US: Het dense. No susp mass, microcals or other sign of malignancy is identified. Focal asymmetry involving upper outer L breast does not persist. US: R- no susp solid mass. Evidence of few cysts measuring up to 6mm. L- No susp solid mass. Evidence of a few cysts including 9mm cyst cluster 2:00 8cmfn. Rec mmg in 1 yr. BR2  10/17/23 NFR, breast MRI: R- No susp enhancement. L- There is a 7 mm T2 hyperintense patchy area of NME in the upper central to slightly inner posterior breast demonstrating progressive type kinetics. Axilla/other: No significant axillary or internal mammary lymphadenopathy. Impression: Probable benign NME left breast, as described above. This may represent a variant of background parenchymal enhancement. As this is a baseline MRI, a 6 mos f/u breast MRI is recommended to ascertain stability. BR3.  3/18/24 Naeemchavo tyler sMMG and US: TC 32.1%. Scattered FG. No susp mass, microcals or other sign of malignancy is identified. Asymmetry in the left breast is stable. There is no significant mammographic change in either breast. XOCHITL- grade 0. US: R- No suspicious solid mass. A few scattered subcentimeter right breast cysts are noted. L- No suspicious solid mass. A few scattered left breast cysts are noted. Impression: no mmg or US evidence of malignancy. Rec mmg in 1yr. BR2.  24 Naeem, MRI: R- No susp enhancement. Upper outer right breast intramammary lymph node. L- Previously noted upper central to slightly inner left breast NME is no longer visualized on today's images favoring variation of background parenchymal enhancement. No susp enhancement. Axilla/other: Mildy prominent bilateral round axillary LNs measuring up to 1.1cm on L and 1.2cm on the right. Otherwise no significant axillary or internal mammary lymphadenopathy. Impression: No MRI evidence of malignancy. Mildly prominent bilateral axillary lymph nodes, indeterminant. Clinical f/u and correlation to physical exam, patient's past medical history or any recent vaccination is rec. If clinically warranted consider further evaluation with targeted ultrasound of axilla. Rec resume annual mmg on schedule BR2.   Fhx: Breast- Mother age 40?  age 42 from metastasis. pAunt age 38. pCousin x2 ages 40 and 41  Patient states genetic testing updated last year with Dr. Morrison, will obtain copy. Pt is unsure if any of her surviving cousins had genetic testing but will inquire.  Currently out on disability due to fall while at work at [employer] [facility] [place], had back surgery  and will return to work once cleared. Works in the OR. Pt was recently started on Moonjaro per cardiology, has only had 1 dose 5 weeks ago due to backorder.   CBE: 32 DD, No discrete masses or lesions, Bilat FCC. No axillary or SC lymphadenopathy.  Bilat palpable mobile nonenlarged lymph nodes.   Reviewed MMG and u/s, no suspicious findings, CBE also negative. MRI showed recent area of enhancement resolved but prominent bilat axillary LN, not obvious on CBE. Rec targeted bilat axillary u/s. Possible referral to hematology, pending results.  Also need to clarify pt's genetic testing results.    Plan Increased risk of breast cancer, HRP program Bilat targeted u/s axilla.  To get us copies of her genetic test.

## 2024-05-07 NOTE — HISTORY OF PRESENT ILLNESS
[FreeTextEntry1] : Referred by GYN, Dr. Karla Mello  Patient is a 44-year-old black female here today for CBE after most recent studies. 6 mos f/u MR noted, mildy prominent bilateral round axillary LNs measuring up to 1.1cm on L and 1.2cm on the right. Pt denies recent vaccine, trauma. No hx of sarcoid or HIV.  Pt denies any breast lesions, discharge or masses. No history of breast biopsies.  Has copy of prior genetic testing result but was only for BRCA1?  23 NFR, bilat sMMG: TC 40.9%. Het dense. No susp mass, microcals or other sign of malignancy is identified in the R breast. Focal asymmetry in the upper outer L breast. Impression: L focal asymmetry rec dMMG. BR0  3/14/23 NFR, L dMMG and bilat US: Het dense. No susp mass, microcals or other sign of malignancy is identified. Focal asymmetry involving upper outer L breast does not persist. US: R- no susp solid mass. Evidence of few cysts measuring up to 6mm. L- No susp solid mass. Evidence of a few cysts including 9mm cyst cluster 2:00 8cmfn. Rec mmg in 1 yr. BR2  10/17/23 NFR, breast MRI: R- No susp enhancement. L- There is a 7 mm T2 hyperintense patchy area of NME in the upper central to slightly inner posterior breast demonstrating progressive type kinetics. Axilla/other: No significant axillary or internal mammary lymphadenopathy. Impression: Probable benign NME left breast, as described above. This may represent a variant of background parenchymal enhancement. As this is a baseline MRI, a 6 mos f/u breast MRI is recommended to ascertain stability. BR3.  3/18/24 Naeemchavo tyler sMMG and US: TC 32.1%. Scattered FG. No susp mass, microcals or other sign of malignancy is identified. Asymmetry in the left breast is stable. There is no significant mammographic change in either breast. XOCHITL- grade 0. US: R- No suspicious solid mass. A few scattered subcentimeter right breast cysts are noted. L- No suspicious solid mass. A few scattered left breast cysts are noted. Impression: no mmg or US evidence of malignancy. Rec mmg in 1yr. BR2.  24 Naeem, MRI: R- No susp enhancement. Upper outer right breast intramammary lymph node. L- Previously noted upper central to slightly inner left breast NME is no longer visualized on today's images favoring variation of background parenchymal enhancement. No susp enhancement. Axilla/other: Mildy prominent bilateral round axillary LNs measuring up to 1.1cm on L and 1.2cm on the right. Otherwise no significant axillary or internal mammary lymphadenopathy. Impression: No MRI evidence of malignancy. Mildly prominent bilateral axillary lymph nodes, indeterminant. Clinical f/u and correlation to physical exam, patient's past medical history or any recent vaccination is rec. If clinically warranted consider further evaluation with targeted ultrasound of axilla. Rec resume annual mmg on schedule BR2.   Fhx: Breast- Mother age 40?  age 42 from metastasis. pAunt age 38. pCousin x2 ages 40 and 41  Patient states genetic testing updated last year with Dr. Morrison, will obtain copy. Pt is unsure if any of her surviving cousins had genetic testing but will inquire.  Currently out on disability due to fall while at work at [employer], had back surgery  and will return to work once cleared. Works in the OR.

## 2024-05-08 ENCOUNTER — APPOINTMENT (OUTPATIENT)
Dept: ULTRASOUND IMAGING | Facility: CLINIC | Age: 44
End: 2024-05-08
Payer: COMMERCIAL

## 2024-05-08 ENCOUNTER — NON-APPOINTMENT (OUTPATIENT)
Age: 44
End: 2024-05-08

## 2024-05-08 PROCEDURE — 76882 US LMTD JT/FCL EVL NVASC XTR: CPT | Mod: RT

## 2024-06-26 ENCOUNTER — NON-APPOINTMENT (OUTPATIENT)
Age: 44
End: 2024-06-26

## 2024-07-03 ENCOUNTER — NON-APPOINTMENT (OUTPATIENT)
Age: 44
End: 2024-07-03

## 2024-07-16 ENCOUNTER — NON-APPOINTMENT (OUTPATIENT)
Age: 44
End: 2024-07-16

## 2024-07-18 ENCOUNTER — OUTPATIENT (OUTPATIENT)
Dept: OUTPATIENT SERVICES | Facility: HOSPITAL | Age: 44
LOS: 1 days | Discharge: HOME | End: 2024-07-18

## 2024-07-18 DIAGNOSIS — Z98.891 HISTORY OF UTERINE SCAR FROM PREVIOUS SURGERY: Chronic | ICD-10-CM

## 2024-07-18 DIAGNOSIS — Z98.890 OTHER SPECIFIED POSTPROCEDURAL STATES: Chronic | ICD-10-CM

## 2024-07-18 DIAGNOSIS — Z90.49 ACQUIRED ABSENCE OF OTHER SPECIFIED PARTS OF DIGESTIVE TRACT: Chronic | ICD-10-CM

## 2024-07-18 DIAGNOSIS — Z15.09 GENETIC SUSCEPTIBILITY TO OTHER MALIGNANT NEOPLASM: ICD-10-CM

## 2024-07-23 ENCOUNTER — APPOINTMENT (OUTPATIENT)
Dept: HEMATOLOGY ONCOLOGY | Facility: CLINIC | Age: 44
End: 2024-07-23

## 2024-07-25 NOTE — DISCUSSION/SUMMARY
[FreeTextEntry1] : The visit was provided via telehealth using real-time 2-way audio visual technology. The patient, Lexie Macedo, was located at home, [address] [place] [address], at the time of the visit. The Genetic Counselor, Fani Cruz, was located remotely in [place], [place]. The patient and the Genetic Counselor both participated in the telehealth encounter. Consent for telehealth services was given on 2024 by the patient, Lexie Macedo.  REASON FOR CONSULT Lexie Macedo is a 44-year-old female who was referred by Ofelia Horner NP for cancer genetic counseling and a discussion regarding her genetic testing results related to hereditary cancer predisposition.   RELEVANT MEDICAL HISTORY  Ms. Macedo is a healthy individual who has never had cancer. She is currently followed by the [facility] in [place] with Dr. Aviles's team. She has a family history of cancer, see below.    She pursued genetic testing using [employer]'s Integrated [employer] with [employer] Hereditary Cancer Test. 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 A variant of uncertain significance (VUS) was detected in the SDHA gene (c.1523C>T; p.Xmv165Ifb). A variant of uncertain significance (VUS) was detected in the TSC2 gene (c.5161-5C>A). This test was ordered by Ofelia Horner NP and reported on 2024.    OTHER MEDICAL AND SURGICAL HISTORY:  Medical: Fibroids, GERD, Prediabetes, HTN Surgical: Back surgery, , Cholecystectomy, Knee surgery   PAST OB/GYN HISTORY:  Obstetrical History:   Age at Menarche: 12 Premenopausal Age at First Live Birth: 24  Oral Contraceptive Use: Yes, approx. 2-3 years total, stopped because hospitalized with blood coagulation Depo: Yes, in her 20s Hormone Replacement Therapy: None Height: 5'4"; Weight: 240   CANCER SCREENING HISTORY:    Breast:  -	Most recent sMammo w Hans Navin/US breast Complete Navin: 3/18/2024; Results: BR2 Benign Findings; Frequency: yearly -	US Axilla Only Bilat: 2024; Results: Bilateral benign-appearing axillary lymph nodes. -	Most recent breast MRI Navin: 2024; Results: BR2 Benign Findings; Frequency: yearly -	Breast Biopsies: None GYN: -	Most recent GYN annual exam: 2023; Frequency: yearly -	Most recent pap smear: 2023; Results: NILM; Frequency: yearly -	Most recent transvaginal ultrasound: 3/2023; Results: A stable enlarged fibroid uterus is seen. Overall dimensions are stable compared to the previous study. 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 A detailed family history of cancer was ascertained, see below and scanned pedigree in chart.    To Ms. Macedo's knowledge, no one in the family has been identified to have a germline mutation related to cancer susceptibility.  She states some paternal cousins may have had testing but is not certain of this.   RESULTS INTERPRETATION AND ASSESSMENT:  Variants of Uncertain Significance were identified in the PMS2, SDHA, and TSC2 genes. At this time the available evidence is insufficient to determine the role of these VUSs in disease and the clinical significance of this result is uncertain.   Individuals with a pathogenic mutation in the PMS2 gene may have an increased risk for Welch Syndrome. It is unknown if the patient has an increased risk for the cancers associated with PMS2 at this time. Of note, [employer], [employer], and [facility] currently classify this PMS2 variant (c.572A>G; p.Ayy511Yji) as Likely Benign per ClinVar.    Individuals with a pathogenic mutation in SDHA may have an increased risk for tumors associated with autosomal dominant Hereditary Paraganglioma-Pheochromocytoma Syndrome and autosomal dominant or autosomal recessive mitochondrial complex II (CII) deficiency. The following is observed currently in ClinVar: [facility] classifies this SDHA variant (c.1523C>T; p.Vml360Uti) as Benign for the hereditary cancer-predisposing syndrome (). Questli classifies this SDHA variant as Likely Benign for the hereditary cancer-predisposing syndrome (). revoPTitae classifies this SDHA variant as Benign for the hereditary paraganglioma syndrome and mitochondrial complex II (CII) deficiency (). [employer] classifies this SDHA variant as Likely Benign for mitochondrial complex II (CII) deficiency (). [employer] classifies this SDHA variant as Benign (), and [employer] classifies this SDHA variant as Likely Benign (). There is also an entry by OMIM () stating this is pathogenic for mitochondrial complex II (CII) deficiency as it was described in the literature in  as being observed in an affected individual compound heterozygous for p.Xeo023Vvj and p.Uty430Hgn SDHA mutations (PMID: 09231796). Of note, [employer] and Questli, which classify this variant as Likely Benign, both recognize this PMID #[id number] publication in their Likely Benign Classification submissions. Therefore, it is unknown if the patient has an increased risk for the cancers/features/conditions associated with the SDHA gene at this time.   Individuals with a pathogenic mutation in TSC2 may have an increased risk for cancers and features associated with Tuberous Sclerosis Complex. It is unknown if the patient has an increased risk for the cancers and features associated with TSC2 at this time. Of note, [employer] currently classifies this TSC2 gene (c.5161-5C>A) as Likely Benign per Clinvar.   The detection of these VUSs should not currently change the patient's medical management. It is NOT recommended at this time that family members use this result for predictive genetic testing or medical management decisions. With more research, a VUS may be reclassified as either disease-causing or benign. Ms. Macedo was encouraged to contact us every 2-3 years to enquire about any new information for this variant, or sooner if there are any changes in her personal or family history of cancer.  Such updates could possibly change our risk assessment and recommendations.    We also discussed that, while no clear cause of the patient's family history of cancer was identified, this result, while reassuring, does entirely not rule out a hereditary cancer risk in the patient. It is possible the patient has a mutation in one of the genes tested that is not detectable by this analysis, or has a mutation in a different gene, either known or unknown. It is also possible there is a hereditary cancer predisposition in the family, but the patient did not inherit it.   Due to the detailed family history provided at this time and multiple breast cancer diagnoses, Ms. Macedo was informed that we will review her case at our Cancer Genetics departmental case conference, and she will be contacted with our recommendations.   PLAN:  1.	We will review Ms. Macedo's case at our departmental case conference and reach out to her with our recommendations. 2.	Patient informed consult note(s) will be available through their [facility] patient portal.        For any additional questions please call Cancer Genetics at [phone].       Fani Cruz MS, [facility]  Genetic Counselor, Cancer Genetics      CC:   Patient  Ofelia Horner NP   ADDENDUM (2024) Call was placed to Ms. Cho on 2024. Her case was reviewed at our 2024 Cancer Genetics departmental case conference. She was informed consensus regarding our management recommendations based on the current reported family history is as follows:  Given Ms. Macedo's current reported family history of cancer, and her negative genetic test results, the following screening guidelines and risk-reducing recommendations were discussed:    BREAST:   -Ms. Macedo was encouraged to continue following-up with her breast care team in [place], [place] for continued long-term management and surveillance with their high-risk breast screening program due to her family history of cancer. There is no clear evidence of a heritable high-penetrance breast cancer predisposition in Ms. Macedo.  OTHER:   - Of note, Ms. Macedo reported a family history of cardiac-related deaths. It was recommended that she discuss the option of cardiac-related genetic testing with her Cardiology Team as it may help to clarify the risk for her and her relatives.  -In the absence of other indications, Ms. Macedo should practice age-appropriate cancer screening of other organ systems as recommended for the general population.     In addition, we discussed Ms. Macedo's maternal and paternal relatives could consider pursuing cancer risk assessment genetic counseling with the option of genetic testing given the family history of early-onset breast cancers. Additionally, we discussed her daughter should practice breast awareness and self-breast exams in addition to a clinical breast exam at her current age with breast cancer risk reassessment for her daughter at approximately age 30.  PLAN:  1.	See above for recommended management.   2.	Testing of family members based on these VUSs is not recommended.   3.	The patient was encouraged to contact us every 2-3 years to check on any changes in interpretation of these VUSs, or sooner if there are changes in her personal or family history of cancer.  4.	Patient informed consult note(s) will be available through their [facility] patient portal.       For any additional questions please call Cancer Genetics at [phone].       Fani Cruz MS, [facility]  Genetic Counselor, Cancer Genetics      CC:   Patient  Ofelia Horner NP

## 2024-07-25 NOTE — DISCUSSION/SUMMARY
[FreeTextEntry1] : The visit was provided via telehealth using real-time 2-way audio visual technology. The patient, Lexie Macedo, was located at home, [address] [place] [address], at the time of the visit. The Genetic Counselor, Fani Cruz, was located remotely in [place], [place]. The patient and the Genetic Counselor both participated in the telehealth encounter. Consent for telehealth services was given on 2024 by the patient, Lexie Macedo.  REASON FOR CONSULT Lexie Macedo is a 44-year-old female who was referred by Ofelia Horner NP for cancer genetic counseling and a discussion regarding her genetic testing results related to hereditary cancer predisposition.   RELEVANT MEDICAL HISTORY  Ms. Macedo is a healthy individual who has never had cancer. She is currently followed by the [facility] in [place] with Dr. Aviles's team. She has a family history of cancer, see below.    She pursued genetic testing using [employer]'s Integrated [employer] with [employer] Hereditary Cancer Test. 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 A variant of uncertain significance (VUS) was detected in the SDHA gene (c.1523C>T; p.Eur607Ljl). A variant of uncertain significance (VUS) was detected in the TSC2 gene (c.5161-5C>A). This test was ordered by Ofelia Horner NP and reported on 2024.    OTHER MEDICAL AND SURGICAL HISTORY:  Medical: Fibroids, GERD, Prediabetes, HTN Surgical: Back surgery, , Cholecystectomy, Knee surgery   PAST OB/GYN HISTORY:  Obstetrical History:   Age at Menarche: 12 Premenopausal Age at First Live Birth: 24  Oral Contraceptive Use: Yes, approx. 2-3 years total, stopped because hospitalized with blood coagulation Depo: Yes, in her 20s Hormone Replacement Therapy: None Height: 5'4"; Weight: 240   CANCER SCREENING HISTORY:    Breast:  -	Most recent sMammo w Hans Navin/US breast Complete Navin: 3/18/2024; Results: BR2 Benign Findings; Frequency: yearly -	US Axilla Only Bilat: 2024; Results: Bilateral benign-appearing axillary lymph nodes. -	Most recent breast MRI Navin: 2024; Results: BR2 Benign Findings; Frequency: yearly -	Breast Biopsies: None GYN: -	Most recent GYN annual exam: 2023; Frequency: yearly -	Most recent pap smear: 2023; Results: NILM; Frequency: yearly -	Most recent transvaginal ultrasound: 3/2023; Results: A stable enlarged fibroid uterus is seen. Overall dimensions are stable compared to the previous study. 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 A detailed family history of cancer was ascertained, see below and scanned pedigree in chart.    To Ms. Macedo's knowledge, no one in the family has been identified to have a germline mutation related to cancer susceptibility.  She states some paternal cousins may have had testing but is not certain of this.   RESULTS INTERPRETATION AND ASSESSMENT:  Variants of Uncertain Significance were identified in the PMS2, SDHA, and TSC2 genes. At this time the available evidence is insufficient to determine the role of these VUSs in disease and the clinical significance of this result is uncertain.   Individuals with a pathogenic mutation in the PMS2 gene may have an increased risk for Welch Syndrome. It is unknown if the patient has an increased risk for the cancers associated with PMS2 at this time. Of note, [employer], [employer], and [facility] currently classify this PMS2 variant (c.572A>G; p.Dqh926Yhh) as Likely Benign per ClinVar.    Individuals with a pathogenic mutation in SDHA may have an increased risk for tumors associated with autosomal dominant Hereditary Paraganglioma-Pheochromocytoma Syndrome and autosomal dominant or autosomal recessive mitochondrial complex II (CII) deficiency. The following is observed currently in ClinVar: [facility]4 classifies this SDHA variant (c.1523C>T; p.Kcb471Njh) as Benign for the hereditary cancer-predisposing syndrome (). Sojern classifies this SDHA variant as Likely Benign for the hereditary cancer-predisposing syndrome (). [employer] classifies this SDHA variant as Benign for the hereditary paraganglioma syndrome and mitochondrial complex II (CII) deficiency (). [employer] classifies this SDHA variant as Likely Benign for mitochondrial complex II (CII) deficiency (). [employer] classifies this SDHA variant as Benign (), and [employer] classifies this SDHA variant as Likely Benign (). There is also an entry by OMIM () stating this is pathogenic for mitochondrial complex II (CII) deficiency as it was described in the literature in  as being observed in an affected individual compound heterozygous for p.Ieh810Ixk and p.Bnc120Ecp SDHA mutations (PMID: [id number]). Of note, [employer] and [employer]ern, which classify this variant as Likely Benign, both recognize this PMID #[id number] publication in their Likely Benign Classification submissions. Therefore, it is unknown if the patient has an increased risk for the cancers/features/conditions associated with the SDHA gene at this time.   Individuals with a pathogenic mutation in TSC2 may have an increased risk for cancers and features associated with Tuberous Sclerosis Complex. It is unknown if the patient has an increased risk for the cancers and features associated with TSC2 at this time. Of note, [employer] currently classifies this TSC2 gene (c.5161-5C>A) as Likely Benign per Clinvar.   The detection of these VUSs should not currently change the patient's medical management. It is NOT recommended at this time that family members use this result for predictive genetic testing or medical management decisions. With more research, a VUS may be reclassified as either disease-causing or benign. Ms. Macedo was encouraged to contact us every 2-3 years to enquire about any new information for this variant, or sooner if there are any changes in her personal or family history of cancer.  Such updates could possibly change our risk assessment and recommendations.    We also discussed that, while no clear cause of the patient's family history of cancer was identified, this result, while reassuring, does entirely not rule out a hereditary cancer risk in the patient. It is possible the patient has a mutation in one of the genes tested that is not detectable by this analysis, or has a mutation in a different gene, either known or unknown. It is also possible there is a hereditary cancer predisposition in the family, but the patient did not inherit it.   Due to the detailed family history provided at this time and multiple breast cancer diagnoses, Ms. Macedo was informed that we will review her case at our Cancer Genetics departmental case conference, and she will be contacted with our recommendations.   PLAN:  1.	We will review Ms. Macedo's case at our departmental case conference and reach out to her with our recommendations. 2.	Patient informed consult note(s) will be available through their [facility] patient portal.        For any additional questions please call Cancer Genetics at [phone].       Fani Cruz MS, [facility]  Genetic Counselor, Cancer Genetics      CC:   Patient  Ofelia Horner NP   ADDENDUM (2024) Call was placed to Ms. Cho on 2024. Her case was reviewed at our 2024 Cancer Genetics departmental case conference. She was informed consensus regarding our management recommendations based on the current reported family history is as follows:  Given Ms. Macedo's current reported family history of cancer, and her negative genetic test results, the following screening guidelines and risk-reducing recommendations were discussed:    BREAST:   -Ms. Macedo was encouraged to continue following-up with her breast care team in [place], [place] for continued long-term management and surveillance with their high-risk breast screening program due to her family history of cancer. There is no clear evidence of a heritable high-penetrance breast cancer predisposition in Ms. Macedo.  OTHER:   - Of note, Ms. Macedo reported a family history of cardiac-related deaths. It was recommended that she discuss the option of cardiac-related genetic testing with her Cardiology Team as it may help to clarify the risk for her and her relatives.  -In the absence of other indications, Ms. Macedo should practice age-appropriate cancer screening of other organ systems as recommended for the general population.     In addition, we discussed Ms. Macedo's maternal and paternal relatives could consider pursuing cancer risk assessment genetic counseling with the option of genetic testing given the family history of early-onset breast cancers. Additionally, we discussed her daughter should practice breast awareness and self-breast exams in addition to a clinical breast exam at her current age with breast cancer risk reassessment for her daughter at approximately age 30.  PLAN:  1.	See above for recommended management.   2.	Testing of family members based on these VUSs is not recommended.   3.	The patient was encouraged to contact us every 2-3 years to check on any changes in interpretation of these VUSs, or sooner if there are changes in her personal or family history of cancer.  4.	Patient informed consult note(s) will be available through their [facility] patient portal.       For any additional questions please call Cancer Genetics at [phone].       Fani Cruz MS, [facility]  Genetic Counselor, Cancer Genetics      CC:   Patient  Ofelia Horner NP

## 2024-08-07 ENCOUNTER — APPOINTMENT (OUTPATIENT)
Dept: CARDIOLOGY | Facility: CLINIC | Age: 44
End: 2024-08-07

## 2024-08-07 PROBLEM — R07.89 ATYPICAL CHEST PAIN: Status: RESOLVED | Noted: 2023-03-10 | Resolved: 2024-08-07

## 2024-08-07 PROCEDURE — 99214 OFFICE O/P EST MOD 30 MIN: CPT

## 2024-08-07 NOTE — ASSESSMENT
[FreeTextEntry1] : JAZMYNE KAMARA is a 44 year old F who presents today Aug 07, 2024 with the above history and the following active issues:   Mixed dyslipidemia. Prediabetes. obesity refractory to lifestyle measures. Cont GLP/gip for weight loss and cardiovascular risk reduction. There is no prior history of pancreatitis, thyroid disease or endocrine cancer. Will provide lab slip after next visit to monitor a1c and lipids with significant weight loss.   HTN Discussed importance of long-term blood pressure control to reduce cardiovascular risk. Echocardiogram normal EF, mild septal hypertrophy Sx hypotension recently with > 20 lb weight loss on GLP/GIP.  Remain off losartan/hctz.  Cont lower dose of toprol 25mg daily. She will take other 25mg tab if BP runs >130/80 consistently.  1 month followup for BP check.   CP - resolved  ruled out for ACS no recurrence ETT no ischemia  Obesity GLP/GIP - uptitrated zepbound to 10mg daily, followup 1 mo  Increase aerobic exercise as tolerated. Low-sodium diet. Limit nonsteroidal anti-inflammatories.       Sincerely,   MIKHAIL Smith Patients history, testing, and plan reviewed with supervising MD: Dr. Sp Rodriguez

## 2024-08-07 NOTE — HISTORY OF PRESENT ILLNESS
[FreeTextEntry1] : JAZMYNE KAMARA  is a 44 year old  F  Significant family history of hypertension and vascular disease. Works in [employer]  Initially recognized to have elevated blood pressure at routine evaluation. At that time she was on prednisone. However blood pressure has been persistently elevated. She is unaware of her elevated blood pressure. She would like to reduce the number of medications. When she takes in the evening she feels unwell the next day.  There is no prior history of a clinical myocardial infarction, coronary revascularization. There is no history of symptomatic congestive heart failure rheumatic heart disease or valvular disease. There is no history of symptomatic arrhythmias including atrial fibrillation.  She is active working. There is no exertional chest pain, pressure or discomfort. There is no significant dyspnea on exertion or orthopnea. There are no symptomatic palpitations, lightheadedness, dizziness or syncope.  Last seen January 2024 started on tirzepatide.   There has been intermittent use d/t availability.  Currently on 7.5mg weekly. Lost >20 lbs. Her BP has dropped. He felt dry mouth, lightheaded, and unwell.  She stopped losartan/hctz, has been off for >1 week. She also reduced toprol to 25mg daily.  /74 today.  She goes on vacation to [place] tomorrow.      EKG demonstrates normal sinus rhythm at 62 bpm.  Reduction in heart rate noted from prior EKG. Blood work February 2024 LDL 77 potassium 3.8 creatinine 0.8 LFTs within normal limits hemoglobin 11.2 A1c 6.1 ETT 3/22/23 6 min 25 sec no ischemia TTE 3/15/23 EF 65-70%, mild septal hypertrophy Monitor normal sinus rhythm avg 95 bpm, rare ectopy

## 2024-08-07 NOTE — HISTORY OF PRESENT ILLNESS
[FreeTextEntry1] : JAZMYNE KAMARA  is a 44 year old  F  Significant family history of hypertension and vascular disease. Works in [employer]  Initially recognized to have elevated blood pressure at routine evaluation. At that time she was on prednisone. However blood pressure has been persistently elevated. She is unaware of her elevated blood pressure. She would like to reduce the number of medications. When she takes in the evening she feels unwell the next day.  There is no prior history of a clinical myocardial infarction, coronary revascularization. There is no history of symptomatic congestive heart failure rheumatic heart disease or valvular disease. There is no history of symptomatic arrhythmias including atrial fibrillation.  She is active working. There is no exertional chest pain, pressure or discomfort. There is no significant dyspnea on exertion or orthopnea. There are no symptomatic palpitations, lightheadedness, dizziness or syncope.  Last seen January 2024 started on tirzepatide.   There has been intermittent use d/t availability.  Currently on 7.5mg weekly. Lost >20 lbs. Her BP has dropped. He felt dry mouth, lightheaded, and unwell.  She stopped losartan/hctz, has been off for >1 week. She also reduced toprol to 25mg daily.  /74 today.  She goes on vacation to [place] tomorrow.      EKG demonstrates normal sinus rhythm at 62 bpm.  Reduction in heart rate noted from prior EKG. Blood work February 2024 LDL 77 potassium 3.8 creatinine 0.8 LFTs within normal limits hemoglobin 11.2 A1c 6.1 ETT 3/22/23 6 min 25 sec no ischemia TTE 3/15/23 EF 65-70%, mild septal hypertrophy Monitor normal sinus rhythm avg 95 bpm, rare ectopy

## 2024-09-13 ENCOUNTER — APPOINTMENT (OUTPATIENT)
Dept: CARDIOLOGY | Facility: CLINIC | Age: 44
End: 2024-09-13
Payer: COMMERCIAL

## 2024-09-13 VITALS
WEIGHT: 230 LBS | DIASTOLIC BLOOD PRESSURE: 80 MMHG | OXYGEN SATURATION: 99 % | HEART RATE: 94 BPM | SYSTOLIC BLOOD PRESSURE: 112 MMHG | BODY MASS INDEX: 39.48 KG/M2

## 2024-09-13 DIAGNOSIS — R00.0 TACHYCARDIA, UNSPECIFIED: ICD-10-CM

## 2024-09-13 DIAGNOSIS — Z71.89 OTHER SPECIFIED COUNSELING: ICD-10-CM

## 2024-09-13 DIAGNOSIS — E66.01 MORBID (SEVERE) OBESITY DUE TO EXCESS CALORIES: ICD-10-CM

## 2024-09-13 DIAGNOSIS — Z82.49 FAMILY HISTORY OF ISCHEMIC HEART DISEASE AND OTHER DISEASES OF THE CIRCULATORY SYSTEM: ICD-10-CM

## 2024-09-13 DIAGNOSIS — R73.03 PREDIABETES.: ICD-10-CM

## 2024-09-13 DIAGNOSIS — I10 ESSENTIAL (PRIMARY) HYPERTENSION: ICD-10-CM

## 2024-09-13 PROCEDURE — 99214 OFFICE O/P EST MOD 30 MIN: CPT

## 2024-09-13 NOTE — ASSESSMENT
[FreeTextEntry1] : JAZMYNE KAMARA is a 44 year old F who presents today Sep 13, 2024 with the above history and the following active issues:   Mixed dyslipidemia. Prediabetes. obesity refractory to lifestyle measures. Cont GLP/gip for weight loss and cardiovascular risk reduction. There is no prior history of pancreatitis, thyroid disease or endocrine cancer. Monitor lipids with weight loss and diet change.   HTN Discussed importance of long-term blood pressure control to reduce cardiovascular risk. Echocardiogram normal EF, mild septal hypertrophy Sx hypotension recently with 40 lb weight loss on GLP/GIP.  She can remain off losartan/hctz and metoprolol given BP controlled today.  She will call if BP elevated for further instruction.  Echo planned before next visit to monitor LVH.   CP - resolved  ruled out for ACS no recurrence ETT no ischemia  Obesity GLP/GIP - uptitrated zepbound to 12.5mg daily, followup 1 mo phone call to discuss uptitration or maintenance based on response. Her personal goal weight is 180 lbs. She may want to taper off drug at some point but understands this may lead to weight gain.  Advised to call if any major AEs noted. Increase aerobic exercise as tolerated. Weigh/resistance training. Low-sodium diet. Limit nonsteroidal anti-inflammatories.  Will call with labs results to monitor a1c, lipids, CRP.  Any questions and concerns were addressed and resolved.   Sincerely,  MIKHAIL Smith Patients history, testing, and plan reviewed with supervising MD: Dr. Dann Valentino

## 2024-09-13 NOTE — HISTORY OF PRESENT ILLNESS
[FreeTextEntry1] : JAZMYNE KAMARA  is a 44 year old  F  Significant family history of hypertension and vascular disease. Works in [employer]  Initially recognized to have elevated blood pressure at routine evaluation. At that time she was on prednisone. However blood pressure has been persistently elevated. She was asx, placed on med rx.   There is no prior history of a clinical myocardial infarction, coronary revascularization. There is no history of symptomatic congestive heart failure rheumatic heart disease or valvular disease. There is no history of symptomatic arrhythmias including atrial fibrillation.  She is active working on her feet all day.  There is no exertional chest pain, pressure or discomfort. There is no significant dyspnea on exertion or orthopnea. There are no symptomatic palpitations, lightheadedness, dizziness or syncope.  January 2024 started on tirzepatide.   There has been 40 lb weight loss. She felt dry mouth, lightheaded, and unwell.  She stopped losartan/hctz, then eventually tapered off metoprolol d/t persistent symptoms.  /78 on my exam today.  She feels well. Her goal weight is 180 lbs.   EKG demonstrates normal sinus rhythm at 62 bpm.  Reduction in heart rate noted from prior EKG. Blood work February 2024 LDL 77 potassium 3.8 creatinine 0.8 LFTs within normal limits hemoglobin 11.2 A1c 6.1 ETT 3/22/23 6 min 25 sec no ischemia TTE 3/15/23 EF 65-70%, mild septal hypertrophy Monitor normal sinus rhythm avg 95 bpm, rare ectopy

## 2024-09-15 LAB
ALBUMIN SERPL ELPH-MCNC: 4.5 G/DL
ALP BLD-CCNC: 53 U/L
ALT SERPL-CCNC: 9 U/L
ANION GAP SERPL CALC-SCNC: 12 MMOL/L
AST SERPL-CCNC: 16 U/L
BILIRUB SERPL-MCNC: 0.3 MG/DL
BUN SERPL-MCNC: 7 MG/DL
CALCIUM SERPL-MCNC: 10 MG/DL
CHLORIDE SERPL-SCNC: 106 MMOL/L
CHOLEST SERPL-MCNC: 135 MG/DL
CO2 SERPL-SCNC: 24 MMOL/L
CREAT SERPL-MCNC: 0.89 MG/DL
CRP SERPL HS-MCNC: 3.95 MG/L
EGFR: 82 ML/MIN/1.73M2
ESTIMATED AVERAGE GLUCOSE: 103 MG/DL
GLUCOSE SERPL-MCNC: 87 MG/DL
HBA1C MFR BLD HPLC: 5.2 %
HDLC SERPL-MCNC: 45 MG/DL
LDLC SERPL CALC-MCNC: 76 MG/DL
NONHDLC SERPL-MCNC: 90 MG/DL
POTASSIUM SERPL-SCNC: 3.9 MMOL/L
PROT SERPL-MCNC: 7.3 G/DL
SODIUM SERPL-SCNC: 142 MMOL/L
TRIGL SERPL-MCNC: 70 MG/DL

## 2024-10-14 LAB
ANION GAP SERPL CALC-SCNC: 13 MMOL/L
BUN SERPL-MCNC: 9 MG/DL
CALCIUM SERPL-MCNC: 10.2 MG/DL
CHLORIDE SERPL-SCNC: 103 MMOL/L
CO2 SERPL-SCNC: 23 MMOL/L
CREAT SERPL-MCNC: 0.82 MG/DL
EGFR: 90 ML/MIN/1.73M2
GLUCOSE SERPL-MCNC: 83 MG/DL
MAGNESIUM SERPL-MCNC: 1.9 MG/DL
POTASSIUM SERPL-SCNC: 4 MMOL/L
SODIUM SERPL-SCNC: 140 MMOL/L

## 2024-10-22 ENCOUNTER — APPOINTMENT (OUTPATIENT)
Dept: CARDIOLOGY | Facility: CLINIC | Age: 44
End: 2024-10-22
Payer: COMMERCIAL

## 2024-10-22 PROCEDURE — 93306 TTE W/DOPPLER COMPLETE: CPT

## 2024-10-29 ENCOUNTER — APPOINTMENT (OUTPATIENT)
Dept: CARDIOLOGY | Facility: CLINIC | Age: 44
End: 2024-10-29
Payer: COMMERCIAL

## 2024-10-29 VITALS
DIASTOLIC BLOOD PRESSURE: 82 MMHG | SYSTOLIC BLOOD PRESSURE: 118 MMHG | BODY MASS INDEX: 37.9 KG/M2 | HEIGHT: 64 IN | WEIGHT: 222 LBS

## 2024-10-29 DIAGNOSIS — R73.03 PREDIABETES.: ICD-10-CM

## 2024-10-29 DIAGNOSIS — Z82.49 FAMILY HISTORY OF ISCHEMIC HEART DISEASE AND OTHER DISEASES OF THE CIRCULATORY SYSTEM: ICD-10-CM

## 2024-10-29 DIAGNOSIS — E66.01 MORBID (SEVERE) OBESITY DUE TO EXCESS CALORIES: ICD-10-CM

## 2024-10-29 DIAGNOSIS — R00.0 TACHYCARDIA, UNSPECIFIED: ICD-10-CM

## 2024-10-29 DIAGNOSIS — Z71.89 OTHER SPECIFIED COUNSELING: ICD-10-CM

## 2024-10-29 DIAGNOSIS — Z86.79 PERSONAL HISTORY OF OTHER DISEASES OF THE CIRCULATORY SYSTEM: ICD-10-CM

## 2024-10-29 PROCEDURE — 99214 OFFICE O/P EST MOD 30 MIN: CPT

## 2024-11-05 RX ORDER — TIRZEPATIDE 15 MG/.5ML
15 INJECTION, SOLUTION SUBCUTANEOUS WEEKLY
Qty: 3 | Refills: 0 | Status: ACTIVE | COMMUNITY
Start: 2024-11-05 | End: 1900-01-01

## 2024-12-16 ENCOUNTER — APPOINTMENT (OUTPATIENT)
Dept: OBGYN | Facility: CLINIC | Age: 44
End: 2024-12-16
Payer: COMMERCIAL

## 2024-12-16 VITALS
SYSTOLIC BLOOD PRESSURE: 110 MMHG | WEIGHT: 214 LBS | DIASTOLIC BLOOD PRESSURE: 76 MMHG | BODY MASS INDEX: 36.54 KG/M2 | HEIGHT: 64 IN

## 2024-12-16 DIAGNOSIS — B37.31 ACUTE CANDIDIASIS OF VULVA AND VAGINA: ICD-10-CM

## 2024-12-16 DIAGNOSIS — Z11.3 ENCOUNTER FOR SCREENING FOR INFECTIONS WITH A PREDOMINANTLY SEXUAL MODE OF TRANSMISSION: ICD-10-CM

## 2024-12-16 DIAGNOSIS — Z01.419 ENCOUNTER FOR GYNECOLOGICAL EXAMINATION (GENERAL) (ROUTINE) W/OUT ABNORMAL FINDINGS: ICD-10-CM

## 2024-12-16 LAB
DATE COLLECTED: NORMAL
HCG UR QL: NEGATIVE
HCG UR QL: NEGATIVE
HEMOCCULT SP1 STL QL: NEGATIVE
QUALITY CONTROL: YES

## 2024-12-16 PROCEDURE — 99396 PREV VISIT EST AGE 40-64: CPT

## 2024-12-17 LAB
C TRACH RRNA SPEC QL NAA+PROBE: NOT DETECTED
CANDIDA VAG CYTO: NOT DETECTED
G VAGINALIS+PREV SP MTYP VAG QL MICRO: NOT DETECTED
HBV SURFACE AB SER QL: REACTIVE
HBV SURFACE AG SER QL: NONREACTIVE
HCV AB SER QL: NONREACTIVE
HCV S/CO RATIO: 0.07 S/CO
HIV1+2 AB SPEC QL IA.RAPID: NONREACTIVE
HPV HIGH+LOW RISK DNA PNL CVX: NOT DETECTED
N GONORRHOEA RRNA SPEC QL NAA+PROBE: NOT DETECTED
SOURCE AMPLIFICATION: NORMAL
SOURCE TP AMPLIFICATION: NORMAL
T PALLIDUM AB SER QL IA: NEGATIVE
T VAGINALIS RRNA SPEC QL NAA+PROBE: NOT DETECTED
T VAGINALIS VAG QL WET PREP: NOT DETECTED

## 2024-12-22 LAB — CYTOLOGY CVX/VAG DOC THIN PREP: NORMAL

## 2024-12-25 PROBLEM — F10.90 ALCOHOL USE: Status: ACTIVE | Noted: 2021-10-07

## 2025-02-27 ENCOUNTER — NON-APPOINTMENT (OUTPATIENT)
Age: 45
End: 2025-02-27

## 2025-02-27 DIAGNOSIS — Z91.89 OTHER SPECIFIED PERSONAL RISK FACTORS, NOT ELSEWHERE CLASSIFIED: ICD-10-CM

## 2025-03-26 ENCOUNTER — RESULT REVIEW (OUTPATIENT)
Age: 45
End: 2025-03-26

## 2025-03-26 ENCOUNTER — APPOINTMENT (OUTPATIENT)
Dept: ULTRASOUND IMAGING | Facility: CLINIC | Age: 45
End: 2025-03-26

## 2025-03-26 ENCOUNTER — APPOINTMENT (OUTPATIENT)
Dept: MAMMOGRAPHY | Facility: CLINIC | Age: 45
End: 2025-03-26
Payer: COMMERCIAL

## 2025-03-26 PROCEDURE — 77063 BREAST TOMOSYNTHESIS BI: CPT

## 2025-03-26 PROCEDURE — 76641 ULTRASOUND BREAST COMPLETE: CPT | Mod: 50

## 2025-03-26 PROCEDURE — 77067 SCR MAMMO BI INCL CAD: CPT

## 2025-03-28 ENCOUNTER — APPOINTMENT (OUTPATIENT)
Dept: MRI IMAGING | Facility: CLINIC | Age: 45
End: 2025-03-28
Payer: COMMERCIAL

## 2025-03-28 PROCEDURE — A9585: CPT

## 2025-03-28 PROCEDURE — 77049 MRI BREAST C-+ W/CAD BI: CPT

## 2025-04-02 ENCOUNTER — NON-APPOINTMENT (OUTPATIENT)
Age: 45
End: 2025-04-02

## 2025-04-11 ENCOUNTER — APPOINTMENT (OUTPATIENT)
Dept: CARDIOLOGY | Facility: CLINIC | Age: 45
End: 2025-04-11
Payer: COMMERCIAL

## 2025-04-11 ENCOUNTER — NON-APPOINTMENT (OUTPATIENT)
Age: 45
End: 2025-04-11

## 2025-04-11 VITALS
OXYGEN SATURATION: 100 % | HEART RATE: 87 BPM | SYSTOLIC BLOOD PRESSURE: 106 MMHG | DIASTOLIC BLOOD PRESSURE: 68 MMHG | HEIGHT: 64 IN | BODY MASS INDEX: 35.85 KG/M2 | WEIGHT: 210 LBS

## 2025-04-11 DIAGNOSIS — Z71.89 OTHER SPECIFIED COUNSELING: ICD-10-CM

## 2025-04-11 DIAGNOSIS — E66.01 MORBID (SEVERE) OBESITY DUE TO EXCESS CALORIES: ICD-10-CM

## 2025-04-11 PROBLEM — R92.30 DENSE BREAST: Status: ACTIVE | Noted: 2023-04-26

## 2025-04-11 PROCEDURE — 93000 ELECTROCARDIOGRAM COMPLETE: CPT

## 2025-04-11 PROCEDURE — 99213 OFFICE O/P EST LOW 20 MIN: CPT

## 2025-04-17 ENCOUNTER — APPOINTMENT (OUTPATIENT)
Dept: BREAST CENTER | Facility: CLINIC | Age: 45
End: 2025-04-17

## 2025-04-17 VITALS
SYSTOLIC BLOOD PRESSURE: 115 MMHG | HEART RATE: 88 BPM | TEMPERATURE: 97.8 F | HEIGHT: 64 IN | OXYGEN SATURATION: 99 % | DIASTOLIC BLOOD PRESSURE: 74 MMHG | WEIGHT: 208 LBS | BODY MASS INDEX: 35.51 KG/M2

## 2025-04-17 DIAGNOSIS — N60.19 DIFFUSE CYSTIC MASTOPATHY OF UNSPECIFIED BREAST: ICD-10-CM

## 2025-04-17 DIAGNOSIS — Z80.3 FAMILY HISTORY OF MALIGNANT NEOPLASM OF BREAST: ICD-10-CM

## 2025-04-17 DIAGNOSIS — Z91.89 OTHER SPECIFIED PERSONAL RISK FACTORS, NOT ELSEWHERE CLASSIFIED: ICD-10-CM

## 2025-04-17 DIAGNOSIS — R92.30 DENSE BREASTS, UNSPECIFIED: ICD-10-CM

## 2025-04-17 PROCEDURE — 99213 OFFICE O/P EST LOW 20 MIN: CPT
